# Patient Record
Sex: FEMALE | Race: WHITE | Employment: FULL TIME | URBAN - METROPOLITAN AREA
[De-identification: names, ages, dates, MRNs, and addresses within clinical notes are randomized per-mention and may not be internally consistent; named-entity substitution may affect disease eponyms.]

---
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
## 2021-01-05 ENCOUNTER — OFFICE VISIT (OUTPATIENT)
Dept: FAMILY MEDICINE CLINIC | Age: 21
End: 2021-01-05
Payer: COMMERCIAL

## 2021-01-05 VITALS
WEIGHT: 131 LBS | BODY MASS INDEX: 23.96 KG/M2 | SYSTOLIC BLOOD PRESSURE: 100 MMHG | TEMPERATURE: 98.3 F | OXYGEN SATURATION: 98 % | DIASTOLIC BLOOD PRESSURE: 68 MMHG | HEART RATE: 88 BPM

## 2021-01-05 DIAGNOSIS — K21.9 GASTROESOPHAGEAL REFLUX DISEASE WITHOUT ESOPHAGITIS: Primary | ICD-10-CM

## 2021-01-05 DIAGNOSIS — R19.7 DIARRHEA, UNSPECIFIED TYPE: ICD-10-CM

## 2021-01-05 PROCEDURE — G8420 CALC BMI NORM PARAMETERS: HCPCS | Performed by: NURSE PRACTITIONER

## 2021-01-05 PROCEDURE — 99213 OFFICE O/P EST LOW 20 MIN: CPT | Performed by: NURSE PRACTITIONER

## 2021-01-05 PROCEDURE — G8484 FLU IMMUNIZE NO ADMIN: HCPCS | Performed by: NURSE PRACTITIONER

## 2021-01-05 PROCEDURE — G8427 DOCREV CUR MEDS BY ELIG CLIN: HCPCS | Performed by: NURSE PRACTITIONER

## 2021-01-05 PROCEDURE — 1036F TOBACCO NON-USER: CPT | Performed by: NURSE PRACTITIONER

## 2021-01-05 RX ORDER — FAMOTIDINE 20 MG/1
20 TABLET, FILM COATED ORAL 2 TIMES DAILY
Qty: 60 TABLET | Refills: 0 | Status: SHIPPED | OUTPATIENT
Start: 2021-01-05 | End: 2021-01-29

## 2021-01-05 RX ORDER — OMEPRAZOLE 40 MG/1
40 CAPSULE, DELAYED RELEASE ORAL
Qty: 90 CAPSULE | Refills: 1 | Status: SHIPPED | OUTPATIENT
Start: 2021-01-05 | End: 2022-09-24

## 2021-01-05 SDOH — ECONOMIC STABILITY: TRANSPORTATION INSECURITY
IN THE PAST 12 MONTHS, HAS LACK OF TRANSPORTATION KEPT YOU FROM MEETINGS, WORK, OR FROM GETTING THINGS NEEDED FOR DAILY LIVING?: NO

## 2021-01-05 SDOH — ECONOMIC STABILITY: FOOD INSECURITY: WITHIN THE PAST 12 MONTHS, THE FOOD YOU BOUGHT JUST DIDN'T LAST AND YOU DIDN'T HAVE MONEY TO GET MORE.: NEVER TRUE

## 2021-01-05 SDOH — ECONOMIC STABILITY: TRANSPORTATION INSECURITY
IN THE PAST 12 MONTHS, HAS THE LACK OF TRANSPORTATION KEPT YOU FROM MEDICAL APPOINTMENTS OR FROM GETTING MEDICATIONS?: NO

## 2021-01-05 SDOH — ECONOMIC STABILITY: FOOD INSECURITY: WITHIN THE PAST 12 MONTHS, YOU WORRIED THAT YOUR FOOD WOULD RUN OUT BEFORE YOU GOT MONEY TO BUY MORE.: NEVER TRUE

## 2021-01-05 ASSESSMENT — PATIENT HEALTH QUESTIONNAIRE - PHQ9
1. LITTLE INTEREST OR PLEASURE IN DOING THINGS: 0
SUM OF ALL RESPONSES TO PHQ9 QUESTIONS 1 & 2: 0

## 2021-01-05 ASSESSMENT — ENCOUNTER SYMPTOMS
BELCHING: 1
NAUSEA: 1
CONSTIPATION: 0
DIARRHEA: 1
ABDOMINAL PAIN: 1
VOMITING: 0

## 2021-01-05 NOTE — PATIENT INSTRUCTIONS
SURVEY:    You may be receiving a survey from [employer] regarding your visit today. Please complete the survey to enable us to provide the highest quality of care to you and your family. If you cannot score us a very good (5 Stars) on any question, please call the office to discuss how we could have made your experience a very good one. Thank you.     Clinical Care Team: ALEXIS Naranjo-TRINITY Lopes LPN    Clerical Team: Jose Gann

## 2021-01-05 NOTE — PROGRESS NOTES
HPI Notes    Name: Zena Hair  : 2000         Chief Complaint:     Chief Complaint   Patient presents with    Abdominal Pain     Patient complains of mid abdomen pain bilateral. She said pain is getting worse. Complains of nausea, no vomiting, no fever, does have diarrhea. History of Present Illness:        Abdominal Pain  This is a new problem. The current episode started more than 1 month ago. The onset quality is sudden. Associated symptoms include belching, diarrhea (occasional) and nausea. Pertinent negatives include no constipation, fever, hematuria, melena or vomiting. Associated symptoms comments: Sensation of lump in throat  Bloating off and on. The pain is aggravated by eating. The pain is relieved by belching and bowel movements. She has tried proton pump inhibitors for the symptoms. The treatment provided mild relief. Past Medical History:     No past medical history on file. Reviewed all health maintenance requirements and ordered appropriate tests  Health Maintenance Due   Topic Date Due    Hepatitis C screen  2001    Varicella vaccine (1 of 2 - 2-dose childhood series) 2002    HPV vaccine (1 - 2-dose series) 2012    HIV screen  2016    Flu vaccine (1) 2020       Past Surgical History:     Past Surgical History:   Procedure Laterality Date    HERNIA REPAIR      MYRINGOTOMY AND TYMPANOSTOMY TUBE PLACEMENT          Medications:       Prior to Admission medications    Medication Sig Start Date End Date Taking?  Authorizing Provider   famotidine (PEPCID) 20 MG tablet Take 1 tablet by mouth 2 times daily 21  Yes ALEXIS Irwin CNP   omeprazole (PRILOSEC) 40 MG delayed release capsule Take 1 capsule by mouth every morning (before breakfast) 21  Yes ALEXIS Irwin CNP   norgestrel-ethinyl estradiol (LO/OVRAL) 0.3-30 MG-MCG per tablet Take 1 tablet by mouth daily 20  Yes Prakash Sethi DO   citalopram (CELEXA) 20 MG tablet Take 1 tablet by mouth daily 9/15/20  Yes Jean Harding APRN - CNP        Allergies:       Bee venom and Pcn [penicillins]    Social History:     Tobacco:    reports that she has never smoked. She has never used smokeless tobacco.  Alcohol:      has no history on file for alcohol. Drug Use:  has no history on file for drug. Family History:      No family history on file. Review of Systems:         Review of Systems   Constitutional: Negative for fever. Gastrointestinal: Positive for abdominal pain, diarrhea (occasional) and nausea. Negative for constipation, melena and vomiting. Genitourinary: Negative for hematuria. Physical Exam:     Vitals:  /68   Pulse 88   Temp 98.3 °F (36.8 °C) (Oral)   Wt 131 lb (59.4 kg)   SpO2 98%   BMI 23.96 kg/m²       Physical Exam  Vitals signs and nursing note reviewed. Constitutional:       Appearance: Normal appearance. She is well-developed. Cardiovascular:      Rate and Rhythm: Normal rate and regular rhythm. Heart sounds: Normal heart sounds, S1 normal and S2 normal.   Pulmonary:      Effort: Pulmonary effort is normal. No respiratory distress. Breath sounds: Normal breath sounds. Abdominal:      General: Bowel sounds are normal.      Palpations: Abdomen is soft. Tenderness: There is abdominal tenderness in the right lower quadrant and epigastric area. Negative signs include Weaver's sign, psoas sign and obturator sign. Skin:     General: Skin is warm and dry. Neurological:      Mental Status: She is alert and oriented to person, place, and time. Data:     No results found for: NA, K, CL, CO2, BUN, CREATININE, GLUCOSE, PROT, LABALBU, BILITOT, ALKPHOS, AST, ALT  No results found for: WBC, RBC, HGB, HCT, MCV, MCH, MCHC, RDW, PLT, MPV  No results found for: TSH  No results found for: CHOL, HDL, PSA, LABA1C       Assessment & Plan        Diagnosis Orders   1.  Gastroesophageal reflux disease without esophagitis  H. pylori antigen   2. Diarrhea, unspecified type  Giardia Antigen    Clostridium Difficile Toxin/Antigen    Gastrointestinal Panel, Molecular     Symptoms seem to be like uncontrolled GERD, however with diarrhea I do want to rule out H. pylori and other stool pathogen. Will send patient for stool studies. We will also start patient on Pepcid 20 mg p.o. twice daily. Patient educated about medication. Patient verbalizes understanding and agreement with plan. All questions answered. If symptoms do not resolve or worsen, return to office. Completed Refills   Requested Prescriptions     Signed Prescriptions Disp Refills    famotidine (PEPCID) 20 MG tablet 60 tablet 0     Sig: Take 1 tablet by mouth 2 times daily    omeprazole (PRILOSEC) 40 MG delayed release capsule 90 capsule 1     Sig: Take 1 capsule by mouth every morning (before breakfast)     No follow-ups on file. Orders Placed This Encounter   Medications    famotidine (PEPCID) 20 MG tablet     Sig: Take 1 tablet by mouth 2 times daily     Dispense:  60 tablet     Refill:  0    omeprazole (PRILOSEC) 40 MG delayed release capsule     Sig: Take 1 capsule by mouth every morning (before breakfast)     Dispense:  90 capsule     Refill:  1     Orders Placed This Encounter   Procedures    Giardia Antigen     Standing Status:   Future     Standing Expiration Date:   1/5/2022    Clostridium Difficile Toxin/Antigen     Standing Status:   Future     Standing Expiration Date:   1/5/2022    Gastrointestinal Panel, Molecular     Standing Status:   Future     Standing Expiration Date:   1/5/2022    H. pylori antigen     Standing Status:   Future     Standing Expiration Date:   1/5/2022         Patient Instructions   SURVEY:    You may be receiving a survey from [employer] regarding your visit today. Please complete the survey to enable us to provide the highest quality of care to you and your family.     If you

## 2021-02-08 ENCOUNTER — TELEPHONE (OUTPATIENT)
Dept: FAMILY MEDICINE CLINIC | Age: 21
End: 2021-02-08

## 2021-02-08 DIAGNOSIS — F41.1 GAD (GENERALIZED ANXIETY DISORDER): Primary | ICD-10-CM

## 2021-02-08 NOTE — LETTER
[address] [place] [address]  Phone: [phone]  Fax: [phone]    ALEXIS Henson CNP        February 8, 2021    Ranulfo Medina  [address] [facility] [address]      To Whom It May Concern:    My patient, Ranulfo Medina, has generalized anxiety disorder (F41.1). It is my opinion that she would benefit from an emotional support animal.     If you have any questions or concerns, please don't hesitate to call.     Sincerely,          ALEXIS Henson CNP

## 2021-02-08 NOTE — TELEPHONE ENCOUNTER
Patient called to see if Sherren Broaden would be willing to write a letter so she can have an emotional support animal at school. She states she has anxiety really bad. Patient last seen 1/5/21. Health Maintenance   Topic Date Due    Hepatitis C screen  2001    Varicella vaccine (1 of 2 - 2-dose childhood series) 02/16/2002    HPV vaccine (1 - 2-dose series) 02/16/2012    HIV screen  02/16/2016    Flu vaccine (1) 09/01/2020    Chlamydia screen  03/19/2021    DTaP/Tdap/Td vaccine (6 - Td) 10/22/2023    Hepatitis B vaccine  Completed    Hib vaccine  Completed    Hepatitis A vaccine  Aged Out    Meningococcal (ACWY) vaccine  Aged Out    Pneumococcal 0-64 years Vaccine  Aged Out             (applicable per patient's age: Cancer Screenings, Depression Screening, Fall Risk Screening, Immunizations)    No results found for: LABA1C, LABMICR, LDLCHOLESTEROL, LDLCALC, AST, ALT, BUN   (goal A1C is < 7)   (goal LDL is <100) need 30-50% reduction from baseline     BP Readings from Last 3 Encounters:   01/05/21 100/68   12/09/20 100/70   10/30/20 104/62    (goal /80)      All Future Testing planned in [facility]:  Lab Frequency Next Occurrence   H. pylori antigen Once 02/13/2021   Giardia Antigen Once 02/13/2021   Clostridium Difficile Toxin/Antigen Once 02/13/2021   Gastrointestinal Panel, Molecular Once 02/13/2021       Next Visit Date:  No future appointments.          Patient Active Problem List:     Dysmenorrhea

## 2021-02-08 NOTE — TELEPHONE ENCOUNTER
I can write a letter stating that she has anxiety, but I can't certain any animal as an WILLARD. What kind of animal is she considering? Usually those types of things come from psychiatry or counseling.

## 2021-02-08 NOTE — TELEPHONE ENCOUNTER
Patient is staying in a single dorm room, she will have her cat.   No form to fill out , just needs a letter stating her dx and she would benefit having an emotional support animal  Please advise

## 2021-02-11 ENCOUNTER — TELEPHONE (OUTPATIENT)
Dept: FAMILY MEDICINE CLINIC | Age: 21
End: 2021-02-11

## 2021-02-11 NOTE — TELEPHONE ENCOUNTER
Patient received an email regarding note you wrote for her to have her cat in her dorm room with her, needs additional information  Question # 1 is how the animal will help patient? Question #2 is Does anxiety impact patient's daily life or bodily function?

## 2021-02-11 NOTE — LETTER
[address] [place] [address]  Phone: [phone]  Fax: Kelsie Painting [phone], APRN - CNP        February 11, 2021    John Hankins  [address] [facility] [address]  [place] [address]      To Whom It May Concern:    My patient, John Hankins, has generalized anxiety disorder (F41.1). She has been diagnosed with this since 2019. Part of the diagnostic criteria in the DSM V is that anxiety is intrusive into the patient's life. Patient has at time had difficulty leaving her home and carrying out daily activities. It is my opinion that she would benefit from an emotional support animal.  Research has shown that petting and holding an emotional support animal for 15 minutes can reduce anxiety by 10%. I believe that this would benefit my patient. If you have any questions or concerns, please don't hesitate to call.     Sincerely,          Zoya Pleitez, ALEXIS - CNP

## 2021-04-19 ENCOUNTER — OFFICE VISIT (OUTPATIENT)
Dept: FAMILY MEDICINE CLINIC | Age: 21
End: 2021-04-19
Payer: COMMERCIAL

## 2021-04-19 VITALS
DIASTOLIC BLOOD PRESSURE: 70 MMHG | HEART RATE: 70 BPM | OXYGEN SATURATION: 98 % | BODY MASS INDEX: 23.96 KG/M2 | SYSTOLIC BLOOD PRESSURE: 102 MMHG | WEIGHT: 131 LBS | TEMPERATURE: 97.7 F

## 2021-04-19 DIAGNOSIS — F33.1 MODERATE EPISODE OF RECURRENT MAJOR DEPRESSIVE DISORDER (HCC): Primary | ICD-10-CM

## 2021-04-19 PROCEDURE — 99213 OFFICE O/P EST LOW 20 MIN: CPT | Performed by: NURSE PRACTITIONER

## 2021-04-19 PROCEDURE — 1036F TOBACCO NON-USER: CPT | Performed by: NURSE PRACTITIONER

## 2021-04-19 PROCEDURE — G8427 DOCREV CUR MEDS BY ELIG CLIN: HCPCS | Performed by: NURSE PRACTITIONER

## 2021-04-19 PROCEDURE — G8420 CALC BMI NORM PARAMETERS: HCPCS | Performed by: NURSE PRACTITIONER

## 2021-04-19 RX ORDER — CITALOPRAM 40 MG/1
40 TABLET ORAL DAILY
Qty: 90 TABLET | Refills: 0 | Status: SHIPPED | OUTPATIENT
Start: 2021-04-19 | End: 2021-11-18

## 2021-04-19 ASSESSMENT — ENCOUNTER SYMPTOMS
DIARRHEA: 0
COUGH: 0
NAUSEA: 0
SHORTNESS OF BREATH: 0
VOMITING: 0

## 2021-04-19 NOTE — PATIENT INSTRUCTIONS
SURVEY:    You may be receiving a survey from [employer] regarding your visit today. Please complete the survey to enable us to provide the highest quality of care to you and your family. If you cannot score us a very good (5 Stars) on any question, please call the office to discuss how we could have made your experience a very good one. Thank you.     Clinical Care Team: ALEXIS Sunshine-TRINITY Cota November, LPN    Clerical Team: Jose Obrien

## 2021-04-19 NOTE — PROGRESS NOTES
HPI Notes    Name: Ibeth An  : 2000         Chief Complaint:     Chief Complaint   Patient presents with   [address] Problem     Patient here today to discuss anxiety and depression, she does not feel her citalopram is working as well. History of Present Illness:        HPI  Pt is a 21 yo female who reports for depression. Pt was dx with depression in Aug 2020 after starting college. Pt was started on Celexa 10mg and then increased to 20mg. Pt states that the medication helped and she felt good. About 3-4 weeks ago pt noticed that she was experiencing increased sadness. States that she has no interest in things she used to enjoy, lays in bed and doesn't want to get up, and doesn't hang out with friends. Has some good days where she feels better, but then has bad days. Exercises every now and then. Denies thoughts of harming herself. Past Medical History:     No past medical history on file. Reviewed all health maintenance requirements and ordered appropriate tests  Health Maintenance Due   Topic Date Due    Hepatitis C screen  Never done    Varicella vaccine (1 of 2 - 2-dose childhood series) Never done    HPV vaccine (1 - 2-dose series) Never done    HIV screen  Never done    COVID-19 Vaccine (1) Never done    Chlamydia screen  2021       Past Surgical History:     Past Surgical History:   Procedure Laterality Date    HERNIA REPAIR      MYRINGOTOMY AND TYMPANOSTOMY TUBE PLACEMENT          Medications:       Prior to Admission medications    Medication Sig Start Date End Date Taking?  Authorizing Provider   citalopram (CELEXA) 40 MG tablet Take 1 tablet by mouth daily 21  Yes ALEXIS Barlow CNP   famotidine (PEPCID) 20 MG tablet take 1 tablet by mouth twice a day 21  Yes ALEXIS Barlow CNP   omeprazole (PRILOSEC) 40 MG delayed release capsule Take 1 capsule by mouth every morning (before breakfast) 21  Yes ALEXIS Barlow CNP   norgestrel-ethinyl estradiol (LO/OVRAL) 0.3-30 MG-MCG per tablet Take 1 tablet by mouth daily 12/9/20  Yes Snow Brewster,         Allergies:       Bee venom and Pcn [penicillins]    Social History:     Tobacco:    reports that she has never smoked. She has never used smokeless tobacco.  Alcohol:      has no history on file for alcohol. Drug Use:  has no history on file for drug. Family History:      No family history on file. Review of Systems:         Review of Systems   Constitutional: Negative for chills and fever. Respiratory: Negative for cough and shortness of breath. Cardiovascular: Negative for chest pain and palpitations. Gastrointestinal: Negative for diarrhea, nausea and vomiting. Neurological: Negative for dizziness, seizures and headaches. Psychiatric/Behavioral: Positive for dysphoric mood and sleep disturbance (hypersomnolence). Negative for suicidal ideas. The patient is not nervous/anxious. Physical Exam:     Vitals:  /70   Pulse 70   Temp 97.7 °F (36.5 °C) (Oral)   Wt 131 lb (59.4 kg)   SpO2 98%   BMI 23.96 kg/m²       Physical Exam  Vitals signs and nursing note reviewed. Constitutional:       Appearance: Normal appearance. She is well-developed. Cardiovascular:      Rate and Rhythm: Normal rate and regular rhythm. Heart sounds: Normal heart sounds, S1 normal and S2 normal.   Pulmonary:      Effort: Pulmonary effort is normal. No respiratory distress. Breath sounds: Normal breath sounds. Abdominal:      General: Bowel sounds are normal.      Palpations: Abdomen is soft. Tenderness: There is no abdominal tenderness. Skin:     General: Skin is warm and dry. Neurological:      Mental Status: She is alert and oriented to person, place, and time. Psychiatric:         Attention and Perception: Attention normal.         Mood and Affect: Mood is depressed.          Speech: Speech normal.         Behavior: Behavior normal. Behavior is cooperative. Thought Content: Thought content does not include homicidal or suicidal ideation. Thought content does not include homicidal or suicidal plan. Cognition and Memory: Cognition normal.         Judgment: Judgment normal.               Data:     No results found for: NA, K, CL, CO2, BUN, CREATININE, GLUCOSE, PROT, LABALBU, BILITOT, ALKPHOS, AST, ALT  No results found for: WBC, RBC, HGB, HCT, MCV, MCH, MCHC, RDW, PLT, MPV  No results found for: TSH  No results found for: CHOL, HDL, PSA, LABA1C       Assessment & Plan        Diagnosis Orders   1. Moderate episode of recurrent major depressive disorder (HCC)       Pt tolerates medication well. Will increase celexa to 40mg. If additional therapy needed, would start on abilify. Would also need to do counseling. Patient verbalizes understanding and agreement with plan. All questions answered. If symptoms do not resolve or worsen, return to office. Completed Refills   Requested Prescriptions     Signed Prescriptions Disp Refills    citalopram (CELEXA) 40 MG tablet 90 tablet 0     Sig: Take 1 tablet by mouth daily     No follow-ups on file. Orders Placed This Encounter   Medications    citalopram (CELEXA) 40 MG tablet     Sig: Take 1 tablet by mouth daily     Dispense:  90 tablet     Refill:  0     No orders of the defined types were placed in this encounter. Patient Instructions   SURVEY:    You may be receiving a survey from [employer] regarding your visit today. Please complete the survey to enable us to provide the highest quality of care to you and your family. If you cannot score us a very good (5 Stars) on any question, please call the office to discuss how we could have made your experience a very good one. Thank you.     Clinical Care Team: ALEXIS Kauffman-TRINITY España LPN    Clerical Team: Jose Mcmahon Stefany Buckley        Electronically signed by ALEXIS Gupta CNP on 4/19/2021 at 8:37 AM           Completed Refills      Requested Prescriptions     Signed Prescriptions Disp Refills    citalopram (CELEXA) 40 MG tablet 90 tablet 0     Sig: Take 1 tablet by mouth daily         Kathy received counseling on the following healthy behaviors: medication adherence  Reviewed prior labs and health maintenance. Continue current medications, diet and exercise. Discussed use, benefit, and side effects of prescribed medications. Barriers to medication compliance addressed. Patient given educational materials - see patient instructions. All patient questions answered. Patient voiced understanding.

## 2021-08-12 ENCOUNTER — OFFICE VISIT (OUTPATIENT)
Dept: FAMILY MEDICINE CLINIC | Age: 21
End: 2021-08-12
Payer: COMMERCIAL

## 2021-08-12 VITALS
SYSTOLIC BLOOD PRESSURE: 96 MMHG | TEMPERATURE: 98.7 F | HEART RATE: 78 BPM | HEIGHT: 62 IN | WEIGHT: 137 LBS | DIASTOLIC BLOOD PRESSURE: 60 MMHG | OXYGEN SATURATION: 98 % | BODY MASS INDEX: 25.21 KG/M2

## 2021-08-12 DIAGNOSIS — Z02.5 ROUTINE SPORTS EXAMINATION: Primary | ICD-10-CM

## 2021-08-12 PROCEDURE — 99395 PREV VISIT EST AGE 18-39: CPT | Performed by: NURSE PRACTITIONER

## 2021-08-12 ASSESSMENT — ENCOUNTER SYMPTOMS
CONSTIPATION: 0
COUGH: 0
ABDOMINAL PAIN: 0
NAUSEA: 0
SORE THROAT: 0
BACK PAIN: 0
DIARRHEA: 0
SHORTNESS OF BREATH: 0
BLOOD IN STOOL: 0
VOMITING: 0

## 2021-08-12 NOTE — PROGRESS NOTES
HPI Notes    Name: Leticia Beckman  : 2000         Chief Complaint:     Chief Complaint   Patient presents with    Annual Exam     Patient here today for sports physica for college. Will be on swim team.       History of Present Illness:        HPI  Pt is a 21 yo female who presents for a college sports physical. Pt will be on the swim team at the [address] [place] [address]. Past Medical History:     No past medical history on file. Reviewed all health maintenance requirements and ordered appropriate tests  Health Maintenance Due   Topic Date Due    Hepatitis C screen  Never done    Varicella vaccine (1 of 2 - 2-dose childhood series) Never done    HPV vaccine (1 - 2-dose series) Never done    COVID-19 Vaccine (1) Never done    HIV screen  Never done    Chlamydia screen  2021       Past Surgical History:     Past Surgical History:   Procedure Laterality Date    HERNIA REPAIR      MYRINGOTOMY AND TYMPANOSTOMY TUBE PLACEMENT          Medications:       Prior to Admission medications    Medication Sig Start Date End Date Taking? Authorizing Provider   ELINEST 0.3-30 MG-MCG per tablet take 1 tablet by mouth once daily 21  Yes Narciso Kayser,    citalopram (CELEXA) 40 MG tablet Take 1 tablet by mouth daily 21  Yes ALEXIS Sidhu CNP   famotidine (PEPCID) 20 MG tablet take 1 tablet by mouth twice a day 21  Yes ALEXIS Sidhu CNP   omeprazole (PRILOSEC) 40 MG delayed release capsule Take 1 capsule by mouth every morning (before breakfast) 21  Yes ALEXIS Sidhu CNP        Allergies:       Bee venom and Pcn [penicillins]    Social History:     Tobacco:    reports that she has never smoked. She has never used smokeless tobacco.  Alcohol:      has no history on file for alcohol use. Drug Use:  has no history on file for drug use. Family History:      No family history on file.     Review of Systems:         Review of Systems   Constitutional: Negative for chills and fever. HENT: Negative for sore throat. Respiratory: Negative for cough and shortness of breath. Cardiovascular: Negative for chest pain, palpitations and leg swelling. Gastrointestinal: Negative for abdominal pain, blood in stool, constipation, diarrhea, nausea and vomiting. Genitourinary: Negative for dysuria, frequency and hematuria. Musculoskeletal: Negative for back pain and neck pain. Skin: Negative for rash. Neurological: Negative for dizziness, tremors, seizures, weakness and headaches. Hematological: Does not bruise/bleed easily. Psychiatric/Behavioral: Negative for suicidal ideas. The patient is not nervous/anxious. Physical Exam:     Vitals:  BP 96/60   Pulse 78   Temp 98.7 °F (37.1 °C) (Oral)   Ht 5' 2\" (1.575 m)   Wt 137 lb (62.1 kg)   SpO2 98%   BMI 25.06 kg/m²       Physical Exam  Vitals and nursing note reviewed. Constitutional:       Appearance: Normal appearance. She is well-developed. HENT:      Head: Normocephalic. Right Ear: Hearing, tympanic membrane and external ear normal.      Left Ear: Hearing, tympanic membrane and external ear normal.      Nose: Nose normal.      Mouth/Throat:      Pharynx: Uvula midline. Eyes:      Conjunctiva/sclera: Conjunctivae normal.      Pupils: Pupils are equal, round, and reactive to light. Neck:      Thyroid: No thyroid mass. Vascular: No carotid bruit. Trachea: Trachea normal.   Cardiovascular:      Rate and Rhythm: Normal rate and regular rhythm. Pulses:           Dorsalis pedis pulses are 2+ on the right side and 2+ on the left side. Heart sounds: Normal heart sounds, S1 normal and S2 normal.   Pulmonary:      Effort: Pulmonary effort is normal.      Breath sounds: Normal breath sounds. Abdominal:      Palpations: Abdomen is soft. Tenderness: There is no abdominal tenderness. Musculoskeletal:         General: Normal range of motion.       Cervical back: Normal range of motion. Comments: Gait steady. No scapular asymmetry or spinal curvature during Wild forward bend test. Deep knee bend and duck walk WNL     Skin:     General: Skin is warm and dry. Findings: No rash. Neurological:      Mental Status: She is alert and oriented to person, place, and time. GCS: GCS eye subscore is 4. GCS verbal subscore is 5. GCS motor subscore is 6. Deep Tendon Reflexes: Reflexes are normal and symmetric. Psychiatric:         Speech: Speech normal.         Behavior: Behavior normal.         Thought Content: Thought content normal.         Judgment: Judgment normal.               Data:     No results found for: NA, K, CL, CO2, BUN, CREATININE, GLUCOSE, PROT, LABALBU, BILITOT, ALKPHOS, AST, ALT  No results found for: WBC, RBC, HGB, HCT, MCV, MCH, MCHC, RDW, PLT, MPV  No results found for: TSH  No results found for: CHOL, HDL, PSA, LABA1C       Assessment & Plan        Diagnosis Orders   1. Routine sports examination       Routine adult health physical.  Patient has no complaints at this time. No abnormalities or deficiencies. Patient educated about adequate water intake  Patient educated about exercise and fitness  Patient educated about nutrition    Patient evaluated and found to be fit for sports. Patient educated about concussion safety and return to play protocol. Patient denies any further needs or questions. Health maintenance requirements reviewed with patient    If any further needs, return to office. Completed Refills   Requested Prescriptions      No prescriptions requested or ordered in this encounter     No follow-ups on file. No orders of the defined types were placed in this encounter. No orders of the defined types were placed in this encounter. Patient Instructions   SURVEY:    You may be receiving a survey from [employer] regarding your visit today.     Please complete the survey to enable us to provide the highest quality of care to you and your family. If you cannot score us a very good (5 Stars) on any question, please call the office to discuss how we could have made your experience a very good one. Thank you. Clinical Care Team: JOHN Arora LPN    Clerical Team: Peng Nelson        Electronically signed by ALEXIS Arora CNP on 8/12/2021 at 9:24 AM           Completed Refills      Requested Prescriptions      No prescriptions requested or ordered in this encounter         Kathy received counseling on the following healthy behaviors: nutrition, exercise and medication adherence  Reviewed prior labs and health maintenance. Continue current medications, diet and exercise. Discussed use, benefit, and side effects of prescribed medications. Barriers to medication compliance addressed. Patient given educational materials - see patient instructions. All patient questions answered. Patient voiced understanding.

## 2021-08-31 ENCOUNTER — OFFICE VISIT (OUTPATIENT)
Dept: FAMILY MEDICINE CLINIC | Age: 21
End: 2021-08-31
Payer: COMMERCIAL

## 2021-08-31 VITALS
WEIGHT: 138 LBS | HEART RATE: 82 BPM | SYSTOLIC BLOOD PRESSURE: 102 MMHG | TEMPERATURE: 97.9 F | DIASTOLIC BLOOD PRESSURE: 60 MMHG | OXYGEN SATURATION: 98 % | BODY MASS INDEX: 25.24 KG/M2

## 2021-08-31 DIAGNOSIS — M21.612 BUNION, LEFT FOOT: Primary | ICD-10-CM

## 2021-08-31 PROCEDURE — 1036F TOBACCO NON-USER: CPT | Performed by: NURSE PRACTITIONER

## 2021-08-31 PROCEDURE — G8419 CALC BMI OUT NRM PARAM NOF/U: HCPCS | Performed by: NURSE PRACTITIONER

## 2021-08-31 PROCEDURE — G8427 DOCREV CUR MEDS BY ELIG CLIN: HCPCS | Performed by: NURSE PRACTITIONER

## 2021-08-31 PROCEDURE — 99213 OFFICE O/P EST LOW 20 MIN: CPT | Performed by: NURSE PRACTITIONER

## 2021-08-31 ASSESSMENT — ENCOUNTER SYMPTOMS
VOMITING: 0
SHORTNESS OF BREATH: 0
NAUSEA: 0
DIARRHEA: 0
COUGH: 0

## 2021-08-31 NOTE — PATIENT INSTRUCTIONS
SURVEY:    You may be receiving a survey from [employer] regarding your visit today. Please complete the survey to enable us to provide the highest quality of care to you and your family. If you cannot score us a very good (5 Stars) on any question, please call the office to discuss how we could have made your experience a very good one. Thank you.     Clinical Care Team: ALEXIS Eldridge-TRINITY Solares LPN    Clerical Team: Jose Perez

## 2021-08-31 NOTE — PROGRESS NOTES
on file for alcohol use. Drug Use:  has no history on file for drug use. Family History:      No family history on file. Review of Systems:         Review of Systems   Constitutional: Negative for chills and fever. Respiratory: Negative for cough and shortness of breath. Cardiovascular: Negative for chest pain and palpitations. Gastrointestinal: Negative for diarrhea, nausea and vomiting. Musculoskeletal:        Left foot pain   Neurological: Negative for dizziness, seizures and headaches. Physical Exam:     Vitals:  /60   Pulse 82   Temp 97.9 °F (36.6 °C) (Oral)   Wt 138 lb (62.6 kg)   SpO2 98%   BMI 25.24 kg/m²       Physical Exam  Vitals and nursing note reviewed. Constitutional:       Appearance: Normal appearance. She is well-developed. Cardiovascular:      Rate and Rhythm: Normal rate and regular rhythm. Heart sounds: Normal heart sounds, S1 normal and S2 normal.   Pulmonary:      Effort: Pulmonary effort is normal. No respiratory distress. Breath sounds: Normal breath sounds. Abdominal:      General: Bowel sounds are normal.      Palpations: Abdomen is soft. Tenderness: There is no abdominal tenderness. Musculoskeletal:        Feet:    Skin:     General: Skin is warm and dry. Neurological:      Mental Status: She is alert and oriented to person, place, and time. Data:     No results found for: NA, K, CL, CO2, BUN, CREATININE, GLUCOSE, PROT, LABALBU, BILITOT, ALKPHOS, AST, ALT  No results found for: WBC, RBC, HGB, HCT, MCV, MCH, MCHC, RDW, PLT, MPV  No results found for: TSH  No results found for: CHOL, HDL, PSA, LABA1C       Assessment & Plan        Diagnosis Orders   1. Bunion, left foot  Cheryl Chen, NEELIMA, Podiatry, [facility]     May use bunion cushions. Will send to Dr Ludmila Mooney for evaluation. Patient verbalizes understanding and agreement with plan. All questions answered.  If symptoms do not resolve or worsen, return to office. Completed Refills   Requested Prescriptions      No prescriptions requested or ordered in this encounter     No follow-ups on file. No orders of the defined types were placed in this encounter. Orders Placed This Encounter   Procedures   Cory Ford DPM, Podiatry, [facility]     Referral Priority:   Routine     Referral Type:   Eval and Treat     Referral Reason:   Specialty Services Required     Referred to Provider:   Travis Osler, DPM     Requested Specialty:   Podiatry     Number of Visits Requested:   1         Patient Instructions   SURVEY:    You may be receiving a survey from [employer] regarding your visit today. Please complete the survey to enable us to provide the highest quality of care to you and your family. If you cannot score us a very good (5 Stars) on any question, please call the office to discuss how we could have made your experience a very good one. Thank you. Clinical Care Team: JOHN Garcia LPN    Clerical Team: Peng Nelson        Electronically signed by ALEXIS Garcia CNP on 8/31/2021 at 12:19 PM           Completed Refills      Requested Prescriptions      No prescriptions requested or ordered in this encounter         Kathy received counseling on the following healthy behaviors: medication adherence  Reviewed prior labs and health maintenance. Continue current medications, diet and exercise. Discussed use, benefit, and side effects of prescribed medications. Barriers to medication compliance addressed. Patient given educational materials - see patient instructions. All patient questions answered. Patient voiced understanding.

## 2021-10-04 ENCOUNTER — TELEPHONE (OUTPATIENT)
Dept: FAMILY MEDICINE CLINIC | Age: 21
End: 2021-10-04

## 2021-10-04 NOTE — TELEPHONE ENCOUNTER
Patient states she has tested negative for covid and strep at her [facility]. She is calling c/o sore throat and cough X 2 weeks. Can we bring her in for an appt or should she have another covid test?  Patient states it was a rapid test at her [facility]. Please advise. Health Maintenance   Topic Date Due    Hepatitis C screen  Never done    Varicella vaccine (1 of 2 - 2-dose childhood series) Never done    HPV vaccine (1 - 2-dose series) Never done    COVID-19 Vaccine (1) Never done    HIV screen  Never done    Chlamydia screen  03/19/2021    Flu vaccine (1) Never done    DTaP/Tdap/Td vaccine (7 - Td or Tdap) 10/22/2023    Hepatitis B vaccine  Completed    Hib vaccine  Completed    Hepatitis A vaccine  Aged Out    Meningococcal (ACWY) vaccine  Aged Out    Pneumococcal 0-64 years Vaccine  Aged Out             (applicable per patient's age: Cancer Screenings, Depression Screening, Fall Risk Screening, Immunizations)    No results found for: LABA1C, LABMICR, LDLCHOLESTEROL, LDLCALC, AST, ALT, BUN   (goal A1C is < 7)   (goal LDL is <100) need 30-50% reduction from baseline     BP Readings from Last 3 Encounters:   08/31/21 102/60   08/12/21 96/60   04/19/21 102/70    (goal /80)      All Future Testing planned in [facility]:  Lab Frequency Next Occurrence   H. pylori antigen Once 01/04/2022   Giardia Antigen Once 01/04/2022   Clostridium Difficile Toxin/Antigen Once 01/04/2022   Gastrointestinal Panel, Molecular Once 01/04/2022       Next Visit Date:  No future appointments.          Patient Active Problem List:     Dysmenorrhea     Moderate episode of recurrent major depressive disorder ([facility] [address]

## 2021-10-05 ENCOUNTER — HOSPITAL ENCOUNTER (OUTPATIENT)
Age: 21
Discharge: HOME | End: 2021-10-05
Payer: COMMERCIAL

## 2021-10-05 ENCOUNTER — OFFICE VISIT (OUTPATIENT)
Dept: FAMILY MEDICINE CLINIC | Age: 21
End: 2021-10-05
Payer: COMMERCIAL

## 2021-10-05 ENCOUNTER — TELEPHONE (OUTPATIENT)
Dept: FAMILY MEDICINE CLINIC | Age: 21
End: 2021-10-05

## 2021-10-05 VITALS
SYSTOLIC BLOOD PRESSURE: 102 MMHG | WEIGHT: 140 LBS | DIASTOLIC BLOOD PRESSURE: 80 MMHG | OXYGEN SATURATION: 98 % | BODY MASS INDEX: 25.61 KG/M2 | TEMPERATURE: 98.2 F | HEART RATE: 76 BPM

## 2021-10-05 DIAGNOSIS — J02.9 ACUTE PHARYNGITIS, UNSPECIFIED ETIOLOGY: ICD-10-CM

## 2021-10-05 DIAGNOSIS — J02.9 ACUTE PHARYNGITIS, UNSPECIFIED ETIOLOGY: Primary | ICD-10-CM

## 2021-10-05 LAB
ABSOLUTE EOS #: 0.2 K/UL (ref 0–0.4)
ABSOLUTE IMMATURE GRANULOCYTE: ABNORMAL K/UL (ref 0–0.3)
ABSOLUTE LYMPH #: 2.5 K/UL (ref 1.2–5.2)
ABSOLUTE MONO #: 0.7 K/UL (ref 0–1)
ALBUMIN SERPL-MCNC: 4.2 G/DL (ref 3.5–5.2)
ALBUMIN/GLOBULIN RATIO: NORMAL (ref 1–2.5)
ALP BLD-CCNC: 64 U/L (ref 35–104)
ALT SERPL-CCNC: 18 U/L (ref 5–33)
ANION GAP SERPL CALCULATED.3IONS-SCNC: 10 MMOL/L (ref 9–17)
AST SERPL-CCNC: 18 U/L
BASOPHILS # BLD: 0 % (ref 0–2)
BASOPHILS ABSOLUTE: 0 K/UL (ref 0–0.2)
BILIRUB SERPL-MCNC: 0.52 MG/DL (ref 0.3–1.2)
BUN BLDV-MCNC: 9 MG/DL (ref 6–20)
BUN/CREAT BLD: 11 (ref 9–20)
CALCIUM SERPL-MCNC: 9.3 MG/DL (ref 8.6–10.4)
CHLORIDE BLD-SCNC: 104 MMOL/L (ref 98–107)
CO2: 23 MMOL/L (ref 20–31)
CREAT SERPL-MCNC: 0.81 MG/DL (ref 0.5–0.9)
DIFFERENTIAL TYPE: YES
EOSINOPHILS RELATIVE PERCENT: 2 % (ref 0–5)
GFR AFRICAN AMERICAN: >60 ML/MIN
GFR NON-AFRICAN AMERICAN: >60 ML/MIN
GFR SERPL CREATININE-BSD FRML MDRD: NORMAL ML/MIN/{1.73_M2}
GFR SERPL CREATININE-BSD FRML MDRD: NORMAL ML/MIN/{1.73_M2}
GLUCOSE BLD-MCNC: 89 MG/DL (ref 70–99)
HCT VFR BLD CALC: 40.2 % (ref 36–46)
HEMOGLOBIN: 13.9 G/DL (ref 12–16)
IMMATURE GRANULOCYTES: ABNORMAL %
LYMPHOCYTES # BLD: 23 % (ref 15–40)
MCH RBC QN AUTO: 30.5 PG (ref 26–34)
MCHC RBC AUTO-ENTMCNC: 34.5 G/DL (ref 31–37)
MCV RBC AUTO: 88.3 FL (ref 80–100)
MONOCYTES # BLD: 6 % (ref 4–8)
MONONUCLEOSIS SCREEN: NEGATIVE
NRBC AUTOMATED: ABNORMAL PER 100 WBC
PDW BLD-RTO: 12.4 % (ref 12.1–15.2)
PLATELET # BLD: 305 K/UL (ref 140–450)
PLATELET ESTIMATE: ABNORMAL
PMV BLD AUTO: ABNORMAL FL (ref 6–12)
POTASSIUM SERPL-SCNC: 4.6 MMOL/L (ref 3.7–5.3)
RBC # BLD: 4.55 M/UL (ref 4–5.2)
RBC # BLD: ABNORMAL 10*6/UL
SEG NEUTROPHILS: 69 % (ref 47–75)
SEGMENTED NEUTROPHILS ABSOLUTE COUNT: 7.2 K/UL (ref 2.5–7)
SODIUM BLD-SCNC: 137 MMOL/L (ref 135–144)
TOTAL PROTEIN: 6.8 G/DL (ref 6.4–8.3)
WBC # BLD: 10.6 K/UL (ref 4.5–13.5)
WBC # BLD: ABNORMAL 10*3/UL

## 2021-10-05 PROCEDURE — 80053 COMPREHEN METABOLIC PANEL: CPT

## 2021-10-05 PROCEDURE — G8484 FLU IMMUNIZE NO ADMIN: HCPCS | Performed by: NURSE PRACTITIONER

## 2021-10-05 PROCEDURE — 36415 COLL VENOUS BLD VENIPUNCTURE: CPT

## 2021-10-05 PROCEDURE — 86308 HETEROPHILE ANTIBODY SCREEN: CPT

## 2021-10-05 PROCEDURE — 1036F TOBACCO NON-USER: CPT | Performed by: NURSE PRACTITIONER

## 2021-10-05 PROCEDURE — 85025 COMPLETE CBC W/AUTO DIFF WBC: CPT

## 2021-10-05 PROCEDURE — 99213 OFFICE O/P EST LOW 20 MIN: CPT | Performed by: NURSE PRACTITIONER

## 2021-10-05 PROCEDURE — G8419 CALC BMI OUT NRM PARAM NOF/U: HCPCS | Performed by: NURSE PRACTITIONER

## 2021-10-05 PROCEDURE — G8427 DOCREV CUR MEDS BY ELIG CLIN: HCPCS | Performed by: NURSE PRACTITIONER

## 2021-10-05 RX ORDER — IBUPROFEN 600 MG/1
TABLET ORAL
COMMUNITY
Start: 2021-09-09 | End: 2022-08-30 | Stop reason: ALTCHOICE

## 2021-10-05 RX ORDER — DOXYCYCLINE HYCLATE 100 MG
100 TABLET ORAL 2 TIMES DAILY
Qty: 14 TABLET | Refills: 0 | Status: SHIPPED | OUTPATIENT
Start: 2021-10-05 | End: 2021-10-12

## 2021-10-05 ASSESSMENT — ENCOUNTER SYMPTOMS
RHINORRHEA: 1
COUGH: 1
SORE THROAT: 1
VOMITING: 0
NAUSEA: 0
DIARRHEA: 0

## 2021-10-05 NOTE — TELEPHONE ENCOUNTER
----- Message from ALEXIS Gonzalez - CNP sent at 10/5/2021 11:19 AM EDT -----  Mono negative.  Please pend doxycycline 100mg PO BID x 7 days

## 2021-10-05 NOTE — PROGRESS NOTES
HPI Notes    Name: Jesi Hendrickson  : 2000         Chief Complaint:     Chief Complaint   Patient presents with    Cough     Patient complains of productive cough x 1 week. No fever now. She said she felt feverish for a couple days.  Pharyngitis     Patient here today with sore throat x 2 weeks. Was checked for covid and strep ,both were negative. Throat is improving       History of Present Illness:        URI   This is a new problem. The current episode started 1 to 4 weeks ago (2 weeks). The problem has been waxing and waning. There has been no fever. Associated symptoms include congestion (minor head), coughing, rhinorrhea and a sore throat. Pertinent negatives include no diarrhea, nausea or vomiting. Associated symptoms comments: Post nasal drip. Past Medical History:     No past medical history on file. Reviewed all health maintenance requirements and ordered appropriate tests  Health Maintenance Due   Topic Date Due    Hepatitis C screen  Never done    Varicella vaccine (1 of 2 - 2-dose childhood series) Never done    HPV vaccine (1 - 2-dose series) Never done    COVID-19 Vaccine (1) Never done    HIV screen  Never done    Chlamydia screen  2021    Flu vaccine (1) Never done       Past Surgical History:     Past Surgical History:   Procedure Laterality Date    HERNIA REPAIR      MYRINGOTOMY AND TYMPANOSTOMY TUBE PLACEMENT          Medications:       Prior to Admission medications    Medication Sig Start Date End Date Taking?  Authorizing Provider   ibuprofen (ADVIL;MOTRIN) 600 MG tablet take 1 tablet by mouth twice a day with food or milk if needed 21  Yes Historical Provider, MD Beal Kitchen 0.3-30 MG-MCG per tablet take 1 tablet by mouth once daily 21  Yes Willard Houston DO   citalopram (CELEXA) 40 MG tablet Take 1 tablet by mouth daily 21  Yes ALEXIS Wells CNP   famotidine (PEPCID) 20 MG tablet take 1 tablet by mouth twice a day 21  Yes Estefanía GranadosALEXIS - CNP   omeprazole (PRILOSEC) 40 MG delayed release capsule Take 1 capsule by mouth every morning (before breakfast) 1/5/21  Yes Estefanía GranadosALEXIS - CNP        Allergies:       Bee venom and Pcn [penicillins]    Social History:     Tobacco:    reports that she has never smoked. She has never used smokeless tobacco.  Alcohol:      has no history on file for alcohol use. Drug Use:  has no history on file for drug use. Family History:      No family history on file. Review of Systems:         Review of Systems   HENT: Positive for congestion (minor head), rhinorrhea and sore throat. Respiratory: Positive for cough. Gastrointestinal: Negative for diarrhea, nausea and vomiting. Physical Exam:     Vitals:  /80   Pulse 76   Temp 98.2 °F (36.8 °C) (Oral)   Wt 140 lb (63.5 kg)   SpO2 98%   BMI 25.61 kg/m²       Physical Exam  Vitals and nursing note reviewed. Constitutional:       Appearance: She is well-developed. HENT:      Nose: Rhinorrhea present. Mouth/Throat:      Pharynx: Posterior oropharyngeal erythema present. No oropharyngeal exudate. Cardiovascular:      Rate and Rhythm: Normal rate and regular rhythm. Pulmonary:      Effort: Pulmonary effort is normal. No respiratory distress. Breath sounds: Normal breath sounds. Skin:     General: Skin is warm and dry. Neurological:      Mental Status: She is alert and oriented to person, place, and time. Data:     No results found for: NA, K, CL, CO2, BUN, CREATININE, GLUCOSE, PROT, LABALBU, BILITOT, ALKPHOS, AST, ALT  No results found for: WBC, RBC, HGB, HCT, MCV, MCH, MCHC, RDW, PLT, MPV  No results found for: TSH  No results found for: CHOL, HDL, PSA, LABA1C       Assessment & Plan        Diagnosis Orders   1.  Acute pharyngitis, unspecified etiology  Mononucleosis Screen    CBC Auto Differential    Comprehensive Metabolic Panel     Pt has been exposed to mononucleosis so will instructions. All patient questions answered. Patient voiced understanding.

## 2021-10-05 NOTE — PATIENT INSTRUCTIONS
SURVEY:    You may be receiving a survey from [employer] regarding your visit today. Please complete the survey to enable us to provide the highest quality of care to you and your family. If you cannot score us a very good (5 Stars) on any question, please call the office to discuss how we could have made your experience a very good one. Thank you.     Clinical Care Team: JOHN Gaona LPN    Clerical Team: Jose Shin

## 2021-10-18 ENCOUNTER — OFFICE VISIT (OUTPATIENT)
Dept: FAMILY MEDICINE CLINIC | Age: 21
End: 2021-10-18
Payer: COMMERCIAL

## 2021-10-18 VITALS
WEIGHT: 140 LBS | RESPIRATION RATE: 20 BRPM | DIASTOLIC BLOOD PRESSURE: 74 MMHG | BODY MASS INDEX: 25.76 KG/M2 | HEIGHT: 62 IN | HEART RATE: 88 BPM | SYSTOLIC BLOOD PRESSURE: 110 MMHG | OXYGEN SATURATION: 98 %

## 2021-10-18 DIAGNOSIS — H93.8X3 EAR FULLNESS, BILATERAL: ICD-10-CM

## 2021-10-18 DIAGNOSIS — H92.03 ACUTE OTALGIA, BILATERAL: Primary | ICD-10-CM

## 2021-10-18 DIAGNOSIS — H65.93 OME (OTITIS MEDIA WITH EFFUSION), BILATERAL: ICD-10-CM

## 2021-10-18 PROCEDURE — 99213 OFFICE O/P EST LOW 20 MIN: CPT | Performed by: STUDENT IN AN ORGANIZED HEALTH CARE EDUCATION/TRAINING PROGRAM

## 2021-10-18 PROCEDURE — G8427 DOCREV CUR MEDS BY ELIG CLIN: HCPCS | Performed by: STUDENT IN AN ORGANIZED HEALTH CARE EDUCATION/TRAINING PROGRAM

## 2021-10-18 PROCEDURE — 1036F TOBACCO NON-USER: CPT | Performed by: STUDENT IN AN ORGANIZED HEALTH CARE EDUCATION/TRAINING PROGRAM

## 2021-10-18 PROCEDURE — G8419 CALC BMI OUT NRM PARAM NOF/U: HCPCS | Performed by: STUDENT IN AN ORGANIZED HEALTH CARE EDUCATION/TRAINING PROGRAM

## 2021-10-18 PROCEDURE — G8484 FLU IMMUNIZE NO ADMIN: HCPCS | Performed by: STUDENT IN AN ORGANIZED HEALTH CARE EDUCATION/TRAINING PROGRAM

## 2021-10-18 ASSESSMENT — ENCOUNTER SYMPTOMS
NAUSEA: 0
SINUS PAIN: 0
COUGH: 0
WHEEZING: 0
RHINORRHEA: 0
DIARRHEA: 0
BACK PAIN: 0
ABDOMINAL PAIN: 0
VOMITING: 0

## 2021-10-18 NOTE — PROGRESS NOTES
HPI Notes    Name: Jabier Lange  : 2000         Chief Complaint:     Chief Complaint   Patient presents with    Otalgia     Bilateral ear pain onset 1 week mostly the rt ear. feels full  has not taken an OTC medications       History of Present Illness:      HPI    This is a 73-year-old woman presenting for evaluation of bilateral ear pain with the right being worse than the left. This is been ongoing since 1 week ago, and is described as \"feeling full, itchy\". She also describes mild discomfort worsening with manipulation of the pinnae. She has not taken any over-the-counter pain relievers. She denies recent illness, and denies fever, sinus drainage, nasal congestion, postnasal drip, cough, rhinorrhea. She does endorse being ill 2 weeks ago (cough, sore throat, headache, mild fever) which was excluded for EBV, GAS, COVID-19. 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 Past Medical History:     No past medical history on file. Reviewed all health maintenance requirements and ordered appropriate tests  Health Maintenance Due   Topic Date Due    Hepatitis C screen  Never done    Varicella vaccine (1 of 2 - 2-dose childhood series) Never done    HPV vaccine (1 - 2-dose series) Never done    COVID-19 Vaccine (1) Never done    HIV screen  Never done    Chlamydia screen  2021    Flu vaccine (1) Never done       Past Surgical History:     Past Surgical History:   Procedure Laterality Date    HERNIA REPAIR      MYRINGOTOMY AND TYMPANOSTOMY TUBE PLACEMENT          Medications:       Prior to Admission medications    Medication Sig Start Date End Date Taking?  Authorizing Provider   ibuprofen (ADVIL;MOTRIN) 600 MG tablet take 1 tablet by mouth twice a day with food or milk if needed 21  Yes Historical Provider, MD Mireille Nixon 0.3-30 MG-MCG per tablet take 1 tablet by mouth once daily 21  Yes Juan Manuel Lopez DO congestion or rhinorrhea. Mouth/Throat:      Mouth: Mucous membranes are moist.      Pharynx: Oropharynx is clear. No oropharyngeal exudate or posterior oropharyngeal erythema. Eyes:      Conjunctiva/sclera: Conjunctivae normal.   Skin:     General: Skin is warm and dry. Capillary Refill: Capillary refill takes less than 2 seconds. Neurological:      General: No focal deficit present. Mental Status: She is alert and oriented to person, place, and time. Mental status is at baseline. Cranial Nerves: No cranial nerve deficit. Psychiatric:         Mood and Affect: Mood normal.         Behavior: Behavior normal.         Thought Content: Thought content normal.                 Data:     Lab Results   Component Value Date     10/05/2021    K 4.6 10/05/2021     10/05/2021    CO2 23 10/05/2021    BUN 9 10/05/2021    CREATININE 0.81 10/05/2021    GLUCOSE 89 10/05/2021    PROT 6.8 10/05/2021    LABALBU 4.2 10/05/2021    BILITOT 0.52 10/05/2021    ALKPHOS 64 10/05/2021    AST 18 10/05/2021    ALT 18 10/05/2021     Lab Results   Component Value Date    WBC 10.6 10/05/2021    RBC 4.55 10/05/2021    HGB 13.9 10/05/2021    HCT 40.2 10/05/2021    MCV 88.3 10/05/2021    MCH 30.5 10/05/2021    MCHC 34.5 10/05/2021    RDW 12.4 10/05/2021     10/05/2021    MPV NOT REPORTED 10/05/2021     No results found for: TSH  No results found for: CHOL, HDL, PSA, LABA1C       Assessment & Plan        Diagnosis Orders   1. Acute otalgia, bilateral     2. Ear fullness, bilateral     3. OME (otitis media with effusion), bilateral         1. This appears to be bilateral OME, discussed natural history, pathophysiology of this condition. Would recommend treating with cetirizine pseudoephedrine 1 tablet/day for 14 days, follow-up if not improved. We also discussed using phenylephrine in addition to plain cetirizine if she becomes symptomatic from using pseudoephedrine.     Follow up as needed          Completed Refills   Requested Prescriptions      No prescriptions requested or ordered in this encounter     Return if symptoms worsen or fail to improve. No orders of the defined types were placed in this encounter. No orders of the defined types were placed in this encounter. Patient Instructions     Kathy,    I'm not concerned for swimmer's ear. You have some fluid behind both ears, but more so behind the left. I'd recommend you  over the counter cetirizine-pseudoephedrine (Zyrtec-D) and take one a day for 14 days. If it makes you feel anxious and jittery, switch to plain cetirizine (Zyrtec) and take phenylephrine (Sudafed PE) for a decongestant. Call me if you don't improve. Thank you for coming to see me today! It was wonderful to meet you! Please give me a call if you have any other questions or problems, and I will see you at your next visit! Dr. Tata Vaca:    Tirso [employer] may be receiving a survey from [employer] regarding your visit today. Please complete the survey to enable us to provide the highest quality of care to you and your family. If you cannot score us a very good on any question, please call the office to discuss how we could of made your experience a very good one. Thank you. Clinical Care Team:     Dr. Tamiko Forman, [facility]:     [id number] [facility]  Patient Education        Middle Ear Fluid: Care Instructions  Your Care Instructions     Fluid often builds up inside the ear during a cold or allergies. Usually the fluid drains away, but sometimes a small tube in the ear, called the eustachian tube, stays blocked for months. Symptoms of fluid buildup may include:  · Popping, ringing, or a feeling of fullness or pressure in the ear. · Trouble hearing. · Balance problems and dizziness. In most cases, you can treat yourself at home.   Follow-up care is a key part of your treatment and safety. Be sure to make and go to all appointments, and call your doctor if you are having problems. It's also a good idea to know your test results and keep a list of the medicines you take. How can you care for yourself at home? · In most cases, the fluid clears up within a few months without treatment. You may need more tests if the fluid does not clear up after 3 months. · If your doctor prescribed antibiotics, take them as directed. Do not stop taking them just because you feel better. You need to take the full course of antibiotics. When should you call for help? Call your doctor now or seek immediate medical care if:    · You have symptoms of infection, such as:  ? Increased pain, swelling, warmth, or redness. ? Pus draining from the area. ? A fever. Watch closely for changes in your health, and be sure to contact your doctor if:    · You notice changes in hearing.     · You do not get better as expected. Where can you learn more? Go to https://Evolv.Offees. org and sign in to your [employer] account. Enter P889 in the weartolook box to learn more about \"Middle Ear Fluid: Care Instructions. \"     If you do not have an account, please click on the \"Sign Up Now\" link. Current as of: December 2, 2020               Content Version: 13.0  © 2006-2021 Healthwise, Incorporated. Care instructions adapted under license by [facility] ([facility]). If you have questions about a medical condition or this instruction, always ask your healthcare professional. [place] [address] any warranty or liability for your use of this information.              Electronically signed by Carlos Bryant DO on 10/18/2021 at 9:04 AM           Completed Refills   Requested Prescriptions      No prescriptions requested or ordered in this encounter         Kathy received counseling on the following healthy behaviors: nutrition, exercise and medication adherence  Reviewed prior labs and health maintenance. Continue current medications, diet and exercise. Discussed use, benefit, and side effects of prescribed medications. Barriers to medication compliance addressed. Patient given educational materials - see patient instructions. All patient questions answered. Patient voiced understanding.

## 2021-10-18 NOTE — PATIENT INSTRUCTIONS
Kathy,    I'm not concerned for swimmer's ear. You have some fluid behind both ears, but more so behind the left. I'd recommend you  over the counter cetirizine-pseudoephedrine (Zyrtec-D) and take one a day for 14 days. If it makes you feel anxious and jittery, switch to plain cetirizine (Zyrtec) and take phenylephrine (Sudafed PE) for a decongestant. Call me if you don't improve. Thank you for coming to see me today! It was wonderful to meet you! Please give me a call if you have any other questions or problems, and I will see you at your next visit! Dr. Mary Gonzales:    Sammy Ag may be receiving a survey from [employer] regarding your visit today. Please complete the survey to enable us to provide the highest quality of care to you and your family. If you cannot score us a very good on any question, please call the office to discuss how we could of made your experience a very good one. Thank you. Clinical Care Team:     Dr. Nikki Mars, [facility]:     [id number] [facility]  Patient Education        Middle Ear Fluid: Care Instructions  Your Care Instructions     Fluid often builds up inside the ear during a cold or allergies. Usually the fluid drains away, but sometimes a small tube in the ear, called the eustachian tube, stays blocked for months. Symptoms of fluid buildup may include:  · Popping, ringing, or a feeling of fullness or pressure in the ear. · Trouble hearing. · Balance problems and dizziness. In most cases, you can treat yourself at home. Follow-up care is a key part of your treatment and safety. Be sure to make and go to all appointments, and call your doctor if you are having problems. It's also a good idea to know your test results and keep a list of the medicines you take. How can you care for yourself at home? · In most cases, the fluid clears up within a few months without treatment.  You may need more tests if the fluid does not clear up after 3 months. · If your doctor prescribed antibiotics, take them as directed. Do not stop taking them just because you feel better. You need to take the full course of antibiotics. When should you call for help? Call your doctor now or seek immediate medical care if:    · You have symptoms of infection, such as:  ? Increased pain, swelling, warmth, or redness. ? Pus draining from the area. ? A fever. Watch closely for changes in your health, and be sure to contact your doctor if:    · You notice changes in hearing.     · You do not get better as expected. Where can you learn more? Go to https://BlooBoxpepiceweb.FansUnite. org and sign in to your "[employer] account. Enter G608 in the [employer] box to learn more about \"Middle Ear Fluid: Care Instructions. \"     If you do not have an account, please click on the \"Sign Up Now\" link. Current as of: December 2, 2020               Content Version: 13.0  © 2006-2021 Healthwise, Incorporated. Care instructions adapted under license by [facility] ([facility]). If you have questions about a medical condition or this instruction, always ask your healthcare professional. [place] [address] any warranty or liability for your use of this information.

## 2021-12-13 RX ORDER — NORGESTREL AND ETHINYL ESTRADIOL 0.3-0.03MG
KIT ORAL
Qty: 84 TABLET | Refills: 1 | Status: SHIPPED | OUTPATIENT
Start: 2021-12-13 | End: 2022-07-20

## 2022-06-27 RX ORDER — CITALOPRAM 40 MG/1
TABLET ORAL
Qty: 90 TABLET | Refills: 1 | Status: SHIPPED | OUTPATIENT
Start: 2022-06-27 | End: 2022-10-19 | Stop reason: ALTCHOICE

## 2022-06-27 NOTE — TELEPHONE ENCOUNTER
Last OV: 10/18/2021 aman   Last RX:    Next scheduled apt: Visit date not found          surescript requesting a refill

## 2022-07-20 RX ORDER — NORGESTREL AND ETHINYL ESTRADIOL 0.3-0.03MG
KIT ORAL
Qty: 56 TABLET | Refills: 5 | Status: SHIPPED | OUTPATIENT
Start: 2022-07-20

## 2022-07-20 NOTE — TELEPHONE ENCOUNTER
Last OV: 10/18/2021 bilateral ear  08/12/21 physical   Last RX:    Next scheduled apt: Visit date not found         Surescript requesting a refill

## 2022-07-26 ENCOUNTER — OFFICE VISIT (OUTPATIENT)
Dept: FAMILY MEDICINE CLINIC | Age: 22
End: 2022-07-26
Payer: COMMERCIAL

## 2022-07-26 ENCOUNTER — HOSPITAL ENCOUNTER (OUTPATIENT)
Age: 22
Discharge: HOME | End: 2022-07-26
Payer: COMMERCIAL

## 2022-07-26 VITALS
BODY MASS INDEX: 27.16 KG/M2 | WEIGHT: 147.6 LBS | SYSTOLIC BLOOD PRESSURE: 110 MMHG | DIASTOLIC BLOOD PRESSURE: 80 MMHG | HEART RATE: 76 BPM | OXYGEN SATURATION: 97 % | HEIGHT: 62 IN | RESPIRATION RATE: 20 BRPM

## 2022-07-26 DIAGNOSIS — K58.0 IRRITABLE BOWEL SYNDROME WITH DIARRHEA: Primary | ICD-10-CM

## 2022-07-26 DIAGNOSIS — K21.9 GASTROESOPHAGEAL REFLUX DISEASE WITHOUT ESOPHAGITIS: ICD-10-CM

## 2022-07-26 DIAGNOSIS — K58.0 IRRITABLE BOWEL SYNDROME WITH DIARRHEA: ICD-10-CM

## 2022-07-26 LAB
ABSOLUTE EOS #: 0.3 K/UL (ref 0–0.4)
ABSOLUTE LYMPH #: 2.3 K/UL (ref 1–4.8)
ABSOLUTE MONO #: 0.6 K/UL (ref 0–1)
ALBUMIN SERPL-MCNC: 4.3 G/DL (ref 3.5–5.2)
ALP BLD-CCNC: 52 U/L (ref 35–104)
ALT SERPL-CCNC: 19 U/L (ref 5–33)
ANION GAP SERPL CALCULATED.3IONS-SCNC: 12 MMOL/L (ref 9–17)
AST SERPL-CCNC: 17 U/L
BASOPHILS # BLD: 0 % (ref 0–2)
BASOPHILS ABSOLUTE: 0 K/UL (ref 0–0.2)
BILIRUB SERPL-MCNC: 0.43 MG/DL (ref 0.3–1.2)
BUN BLDV-MCNC: 12 MG/DL (ref 6–20)
BUN/CREAT BLD: 14 (ref 9–20)
CALCIUM SERPL-MCNC: 9 MG/DL (ref 8.6–10.4)
CHLORIDE BLD-SCNC: 103 MMOL/L (ref 98–107)
CO2: 25 MMOL/L (ref 20–31)
CREAT SERPL-MCNC: 0.84 MG/DL (ref 0.5–0.9)
DIFFERENTIAL TYPE: YES
EOSINOPHILS RELATIVE PERCENT: 3 % (ref 0–5)
GFR AFRICAN AMERICAN: >60 ML/MIN
GFR NON-AFRICAN AMERICAN: >60 ML/MIN
GFR SERPL CREATININE-BSD FRML MDRD: NORMAL ML/MIN/{1.73_M2}
GLUCOSE BLD-MCNC: 84 MG/DL (ref 70–99)
HCT VFR BLD CALC: 41.1 % (ref 36–46)
HEMOGLOBIN: 13.9 G/DL (ref 12–16)
LYMPHOCYTES # BLD: 24 % (ref 15–40)
MCH RBC QN AUTO: 29.7 PG (ref 26–34)
MCHC RBC AUTO-ENTMCNC: 33.9 G/DL (ref 31–37)
MCV RBC AUTO: 87.7 FL (ref 80–100)
MONOCYTES # BLD: 6 % (ref 4–8)
PDW BLD-RTO: 12.3 % (ref 12.1–15.2)
PLATELET # BLD: 303 K/UL (ref 140–450)
POTASSIUM SERPL-SCNC: 4.4 MMOL/L (ref 3.7–5.3)
RBC # BLD: 4.69 M/UL (ref 4–5.2)
SEG NEUTROPHILS: 67 % (ref 47–75)
SEGMENTED NEUTROPHILS ABSOLUTE COUNT: 6.4 K/UL (ref 2.5–7)
SODIUM BLD-SCNC: 140 MMOL/L (ref 135–144)
TOTAL PROTEIN: 6.7 G/DL (ref 6.4–8.3)
WBC # BLD: 9.6 K/UL (ref 4.5–13.5)

## 2022-07-26 PROCEDURE — G8419 CALC BMI OUT NRM PARAM NOF/U: HCPCS | Performed by: NURSE PRACTITIONER

## 2022-07-26 PROCEDURE — 82784 ASSAY IGA/IGD/IGG/IGM EACH: CPT

## 2022-07-26 PROCEDURE — 83516 IMMUNOASSAY NONANTIBODY: CPT

## 2022-07-26 PROCEDURE — 99213 OFFICE O/P EST LOW 20 MIN: CPT | Performed by: NURSE PRACTITIONER

## 2022-07-26 PROCEDURE — 1036F TOBACCO NON-USER: CPT | Performed by: NURSE PRACTITIONER

## 2022-07-26 PROCEDURE — 80053 COMPREHEN METABOLIC PANEL: CPT

## 2022-07-26 PROCEDURE — G8427 DOCREV CUR MEDS BY ELIG CLIN: HCPCS | Performed by: NURSE PRACTITIONER

## 2022-07-26 PROCEDURE — 36415 COLL VENOUS BLD VENIPUNCTURE: CPT

## 2022-07-26 PROCEDURE — 85025 COMPLETE CBC W/AUTO DIFF WBC: CPT

## 2022-07-26 RX ORDER — DICYCLOMINE HYDROCHLORIDE 10 MG/1
10 CAPSULE ORAL
Qty: 120 CAPSULE | Refills: 0 | Status: SHIPPED | OUTPATIENT
Start: 2022-07-26 | End: 2022-08-22

## 2022-07-26 SDOH — ECONOMIC STABILITY: FOOD INSECURITY: WITHIN THE PAST 12 MONTHS, THE FOOD YOU BOUGHT JUST DIDN'T LAST AND YOU DIDN'T HAVE MONEY TO GET MORE.: NEVER TRUE

## 2022-07-26 SDOH — ECONOMIC STABILITY: FOOD INSECURITY: WITHIN THE PAST 12 MONTHS, YOU WORRIED THAT YOUR FOOD WOULD RUN OUT BEFORE YOU GOT MONEY TO BUY MORE.: NEVER TRUE

## 2022-07-26 ASSESSMENT — PATIENT HEALTH QUESTIONNAIRE - PHQ9
9. THOUGHTS THAT YOU WOULD BE BETTER OFF DEAD, OR OF HURTING YOURSELF: 0
7. TROUBLE CONCENTRATING ON THINGS, SUCH AS READING THE NEWSPAPER OR WATCHING TELEVISION: 0
4. FEELING TIRED OR HAVING LITTLE ENERGY: 0
SUM OF ALL RESPONSES TO PHQ QUESTIONS 1-9: 0
SUM OF ALL RESPONSES TO PHQ9 QUESTIONS 1 & 2: 0
1. LITTLE INTEREST OR PLEASURE IN DOING THINGS: 0
6. FEELING BAD ABOUT YOURSELF - OR THAT YOU ARE A FAILURE OR HAVE LET YOURSELF OR YOUR FAMILY DOWN: 0
10. IF YOU CHECKED OFF ANY PROBLEMS, HOW DIFFICULT HAVE THESE PROBLEMS MADE IT FOR YOU TO DO YOUR WORK, TAKE CARE OF THINGS AT HOME, OR GET ALONG WITH OTHER PEOPLE: 0
3. TROUBLE FALLING OR STAYING ASLEEP: 0
SUM OF ALL RESPONSES TO PHQ QUESTIONS 1-9: 0
SUM OF ALL RESPONSES TO PHQ QUESTIONS 1-9: 0
8. MOVING OR SPEAKING SO SLOWLY THAT OTHER PEOPLE COULD HAVE NOTICED. OR THE OPPOSITE, BEING SO FIGETY OR RESTLESS THAT YOU HAVE BEEN MOVING AROUND A LOT MORE THAN USUAL: 0
2. FEELING DOWN, DEPRESSED OR HOPELESS: 0
SUM OF ALL RESPONSES TO PHQ QUESTIONS 1-9: 0
5. POOR APPETITE OR OVEREATING: 0

## 2022-07-26 ASSESSMENT — ENCOUNTER SYMPTOMS
SHORTNESS OF BREATH: 0
ABDOMINAL PAIN: 1
ANAL BLEEDING: 0
DIARRHEA: 1
BLOOD IN STOOL: 0
CONSTIPATION: 0
VOMITING: 0
NAUSEA: 0
COUGH: 0

## 2022-07-26 ASSESSMENT — SOCIAL DETERMINANTS OF HEALTH (SDOH): HOW HARD IS IT FOR YOU TO PAY FOR THE VERY BASICS LIKE FOOD, HOUSING, MEDICAL CARE, AND HEATING?: NOT HARD AT ALL

## 2022-07-26 NOTE — PROGRESS NOTES
HPI Notes    Name: Glendy [place]  : 2000         Chief Complaint:     Chief Complaint   Patient presents with    Abdominal Pain     States she has always had ABD pain but has recently gotten worse states after she eats she has 3 BM (loose Stool)       History of Present Illness:        HPI  Pt is a 26 yo female who reports for abd pain. Pt has struggled with abd pain off and on for several years, but about 3 months ago symptoms changed. Noticed that right after a meal, especially pasta, salad, bread, she has diarrhea 3-4 times the rest of the abd. Feels lower stomach cramps and pain. Past Medical History:     No past medical history on file. Reviewed all health maintenance requirements and ordered appropriate tests  Health Maintenance Due   Topic Date Due    COVID-19 Vaccine (1) Never done    Varicella vaccine (1 of 2 - 2-dose childhood series) Never done    HPV vaccine (1 - 2-dose series) Never done    HIV screen  Never done    Hepatitis C screen  Never done    Chlamydia screen  2021    Depression Monitoring  2022    Pap smear  Never done       Past Surgical History:     Past Surgical History:   Procedure Laterality Date    HERNIA REPAIR      MYRINGOTOMY AND TYMPANOSTOMY TUBE PLACEMENT          Medications:       Prior to Admission medications    Medication Sig Start Date End Date Taking?  Authorizing Provider   dicyclomine (BENTYL) 10 MG capsule Take 1 capsule by mouth 4 times daily (before meals and nightly) 22  Yes ALEXIS Barger CNP   ELINEST 0.3-30 MG-MCG per tablet take 1 tablet by mouth once daily 22  Yes ALEXIS Barger CNP   citalopram (CELEXA) 40 MG tablet take 1 tablet by mouth once daily 22  Yes John Sears DO   omeprazole (PRILOSEC) 40 MG delayed release capsule Take 1 capsule by mouth every morning (before breakfast) 21  Yes ALEXIS Barger CNP   ibuprofen (ADVIL;MOTRIN) 600 MG tablet take 1 tablet by mouth twice a day with food or milk if needed  Patient not taking: Reported on 7/26/2022 9/9/21   Historical Provider, MD        Allergies:       Bee venom and Pcn [penicillins]    Social History:     Tobacco:    reports that she has never smoked. She has never used smokeless tobacco.  Alcohol:      has no history on file for alcohol use. Drug Use:  has no history on file for drug use. Family History:     No family history on file. Review of Systems:         Review of Systems   Constitutional:  Negative for chills and fever. Respiratory:  Negative for cough and shortness of breath. Cardiovascular:  Negative for chest pain and palpitations. Gastrointestinal:  Positive for abdominal pain and diarrhea. Negative for anal bleeding, blood in stool, constipation, nausea and vomiting. Neurological:  Negative for dizziness, seizures and headaches. Physical Exam:     Vitals:  /80 (Site: Left Upper Arm, Position: Sitting)   Pulse 76   Resp 20   Ht 5' 2\" (1.575 m)   Wt 147 lb 9.6 oz (67 kg)   SpO2 97%   BMI 27.00 kg/m²       Physical Exam  Vitals and nursing note reviewed. Constitutional:       Appearance: Normal appearance. She is well-developed. Cardiovascular:      Rate and Rhythm: Normal rate and regular rhythm. Heart sounds: Normal heart sounds, S1 normal and S2 normal.   Pulmonary:      Effort: Pulmonary effort is normal. No respiratory distress. Breath sounds: Normal breath sounds. Abdominal:      General: Bowel sounds are normal.      Palpations: Abdomen is soft. Tenderness: There is generalized abdominal tenderness. Skin:     General: Skin is warm and dry. Neurological:      Mental Status: She is alert and oriented to person, place, and time.              Data:     Lab Results   Component Value Date/Time     07/26/2022 09:15 AM    K 4.4 07/26/2022 09:15 AM     07/26/2022 09:15 AM    CO2 25 07/26/2022 09:15 AM    BUN 12 07/26/2022 09:15 AM    CREATININE 0.84 07/26/2022 09:15 AM    GLUCOSE 84 07/26/2022 09:15 AM    PROT 6.7 07/26/2022 09:15 AM    LABALBU 4.3 07/26/2022 09:15 AM    BILITOT 0.43 07/26/2022 09:15 AM    ALKPHOS 52 07/26/2022 09:15 AM    AST 17 07/26/2022 09:15 AM    ALT 19 07/26/2022 09:15 AM     Lab Results   Component Value Date/Time    WBC 9.6 07/26/2022 09:15 AM    RBC 4.69 07/26/2022 09:15 AM    HGB 13.9 07/26/2022 09:15 AM    HCT 41.1 07/26/2022 09:15 AM    MCV 87.7 07/26/2022 09:15 AM    MCH 29.7 07/26/2022 09:15 AM    MCHC 33.9 07/26/2022 09:15 AM    RDW 12.3 07/26/2022 09:15 AM     07/26/2022 09:15 AM    MPV NOT REPORTED 10/05/2021 09:47 AM     No results found for: TSH  No results found for: CHOL, LDL, HDL, PSA, LABA1C       Assessment & Plan        Diagnosis Orders   1. Irritable bowel syndrome with diarrhea  Celiac Reflex Panel    Comprehensive Metabolic Panel    CBC with Auto Differential      2. Gastroesophageal reflux disease without esophagitis  Celiac Reflex Panel    Comprehensive Metabolic Panel    CBC with Auto Differential        Will send for celiac panel and other labs. Will give bentyl AC&HS. Pt educated about medication. Patient verbalizes understanding and agreement with plan. All questions answered. If symptoms do not resolve or worsen, return to office. Completed Refills   Requested Prescriptions     Signed Prescriptions Disp Refills    dicyclomine (BENTYL) 10 MG capsule 120 capsule 0     Sig: Take 1 capsule by mouth 4 times daily (before meals and nightly)     Return in about 4 weeks (around 8/23/2022).   Orders Placed This Encounter   Medications    dicyclomine (BENTYL) 10 MG capsule     Sig: Take 1 capsule by mouth 4 times daily (before meals and nightly)     Dispense:  120 capsule     Refill:  0     Orders Placed This Encounter   Procedures    Celiac Reflex Panel     Standing Status:   Future     Number of Occurrences:   1     Standing Expiration Date:   7/26/2023    Comprehensive Metabolic Panel Standing Status:   Future     Number of Occurrences:   1     Standing Expiration Date:   8/30/2023    CBC with Auto Differential     Standing Status:   Future     Number of Occurrences:   1     Standing Expiration Date:   8/30/2023         There are no Patient Instructions on file for this visit.     Electronically signed by ALEXIS Valle CNP on 7/26/2022 at 12:40 PM           Completed Refills   Requested Prescriptions     Signed Prescriptions Disp Refills    dicyclomine (BENTYL) 10 MG capsule 120 capsule 0     Sig: Take 1 capsule by mouth 4 times daily (before meals and nightly)

## 2022-07-27 LAB
GLIADIN DEAMINIDATED PEPTIDE AB IGA: 0.6 U/ML
GLIADIN DEAMINIDATED PEPTIDE AB IGG: <0.4 U/ML
IGA: 159 MG/DL (ref 70–400)
TISSUE TRANSGLUTAMINASE ANTIBODY IGG: <0.6 U/ML
TISSUE TRANSGLUTAMINASE IGA: 0.4 U/ML

## 2022-08-22 RX ORDER — DICYCLOMINE HYDROCHLORIDE 10 MG/1
CAPSULE ORAL
Qty: 120 CAPSULE | Refills: 0 | Status: SHIPPED | OUTPATIENT
Start: 2022-08-22 | End: 2022-09-24

## 2022-08-30 ENCOUNTER — TELEPHONE (OUTPATIENT)
Dept: FAMILY MEDICINE CLINIC | Age: 22
End: 2022-08-30

## 2022-08-30 ENCOUNTER — OFFICE VISIT (OUTPATIENT)
Dept: FAMILY MEDICINE CLINIC | Age: 22
End: 2022-08-30
Payer: COMMERCIAL

## 2022-08-30 VITALS
SYSTOLIC BLOOD PRESSURE: 100 MMHG | HEART RATE: 70 BPM | TEMPERATURE: 98.7 F | DIASTOLIC BLOOD PRESSURE: 60 MMHG | OXYGEN SATURATION: 98 %

## 2022-08-30 DIAGNOSIS — K58.0 IRRITABLE BOWEL SYNDROME WITH DIARRHEA: ICD-10-CM

## 2022-08-30 DIAGNOSIS — F41.1 GAD (GENERALIZED ANXIETY DISORDER): Primary | ICD-10-CM

## 2022-08-30 PROBLEM — K21.9 GASTROESOPHAGEAL REFLUX DISEASE WITHOUT ESOPHAGITIS: Status: RESOLVED | Noted: 2022-07-26 | Resolved: 2022-08-30

## 2022-08-30 PROCEDURE — 99214 OFFICE O/P EST MOD 30 MIN: CPT | Performed by: NURSE PRACTITIONER

## 2022-08-30 PROCEDURE — G8419 CALC BMI OUT NRM PARAM NOF/U: HCPCS | Performed by: NURSE PRACTITIONER

## 2022-08-30 PROCEDURE — 1036F TOBACCO NON-USER: CPT | Performed by: NURSE PRACTITIONER

## 2022-08-30 PROCEDURE — G8427 DOCREV CUR MEDS BY ELIG CLIN: HCPCS | Performed by: NURSE PRACTITIONER

## 2022-08-30 RX ORDER — SERTRALINE HYDROCHLORIDE 25 MG/1
25 TABLET, FILM COATED ORAL DAILY
Qty: 30 TABLET | Refills: 0 | Status: SHIPPED | OUTPATIENT
Start: 2022-08-30 | End: 2022-09-24

## 2022-08-30 ASSESSMENT — ENCOUNTER SYMPTOMS
NAUSEA: 0
COUGH: 0
VOMITING: 0
SHORTNESS OF BREATH: 0
DIARRHEA: 0

## 2022-08-30 NOTE — PROGRESS NOTES
HPI Notes    Name: Rico Aguirre  : 2000         Chief Complaint:     Chief Complaint   Patient presents with    Mental Health Problem     Patient here today to discuss changing her citalopram for her anxiety. Medication does not seem to be helping. History of Present Illness:        HPI  Pt is a 26 yo female who presents for follow on anxiety. Pt has been on celexa for the past 2 years. Dose has been adjusted from 10mg to 20mg to current dose of 40mg. Pt states that she feels a lot of anxiety while in class but also in general while at college. Does not seem like celexa is working anymore. IBS-D  Pt was started on bentyl for IBS-D, abd pain, and bloating. Pt states that she no longer takes omeprazole. Takes Bentyl before each meal and has not had any problems. Has a BM once a day and it is soft, formed. Still has some bloating and gets occasional abd pain if she \"eats poorly\". Past Medical History:     No past medical history on file. Reviewed all health maintenance requirements and ordered appropriate tests  Health Maintenance Due   Topic Date Due    COVID-19 Vaccine (1) Never done    Varicella vaccine (1 of 2 - 2-dose childhood series) Never done    HPV vaccine (1 - 2-dose series) Never done    HIV screen  Never done    Hepatitis C screen  Never done    Chlamydia screen  2021    Pap smear  Never done       Past Surgical History:     Past Surgical History:   Procedure Laterality Date    HERNIA REPAIR      MYRINGOTOMY AND TYMPANOSTOMY TUBE PLACEMENT          Medications:       Prior to Admission medications    Medication Sig Start Date End Date Taking?  Authorizing Provider   sertraline (ZOLOFT) 25 MG tablet Take 1 tablet by mouth daily 22  Yes ALEXIS Lockett CNP   rifAXIMin (XIFAXAN) 550 MG tablet Take 1 tablet by mouth 3 times daily for 14 days 22 Yes ALEXIS Lockett CNP   dicyclomine (BENTYL) 10 MG capsule take 1 capsule by mouth four 140 07/26/2022 09:15 AM    K 4.4 07/26/2022 09:15 AM     07/26/2022 09:15 AM    CO2 25 07/26/2022 09:15 AM    BUN 12 07/26/2022 09:15 AM    CREATININE 0.84 07/26/2022 09:15 AM    GLUCOSE 84 07/26/2022 09:15 AM    PROT 6.7 07/26/2022 09:15 AM    LABALBU 4.3 07/26/2022 09:15 AM    BILITOT 0.43 07/26/2022 09:15 AM    ALKPHOS 52 07/26/2022 09:15 AM    AST 17 07/26/2022 09:15 AM    ALT 19 07/26/2022 09:15 AM     Lab Results   Component Value Date/Time    WBC 9.6 07/26/2022 09:15 AM    RBC 4.69 07/26/2022 09:15 AM    HGB 13.9 07/26/2022 09:15 AM    HCT 41.1 07/26/2022 09:15 AM    MCV 87.7 07/26/2022 09:15 AM    MCH 29.7 07/26/2022 09:15 AM    MCHC 33.9 07/26/2022 09:15 AM    RDW 12.3 07/26/2022 09:15 AM     07/26/2022 09:15 AM    MPV NOT REPORTED 10/05/2021 09:47 AM     No results found for: TSH  No results found for: CHOL, LDL, HDL, PSA, LABA1C       Assessment & Plan        Diagnosis Orders   1. MAVIS (generalized anxiety disorder)  --will discontinue celexa and change to zoloft 25mg daily. Pt educated about medication. 2. Irritable bowel syndrome with diarrhea   --doing better with bentyl but problem may be related to overgrown of bacteria in the gut. Will do 2 week treatment of xifaxin 550mg TID. Pt educated about medication. Patient verbalizes understanding and agreement with plan. All questions answered. If symptoms do not resolve or worsen, return to office. Completed Refills   Requested Prescriptions     Signed Prescriptions Disp Refills    sertraline (ZOLOFT) 25 MG tablet 30 tablet 0     Sig: Take 1 tablet by mouth daily    rifAXIMin (XIFAXAN) 550 MG tablet 42 tablet 0     Sig: Take 1 tablet by mouth 3 times daily for 14 days     No follow-ups on file.   Orders Placed This Encounter   Medications    sertraline (ZOLOFT) 25 MG tablet     Sig: Take 1 tablet by mouth daily     Dispense:  30 tablet     Refill:  0    rifAXIMin (XIFAXAN) 550 MG tablet     Sig: Take 1 tablet by mouth 3 times daily for 14 days     Dispense:  42 tablet     Refill:  0     No orders of the defined types were placed in this encounter. Patient Instructions   SURVEY:    You may be receiving a survey from [employer] regarding your visit today. Please complete the survey to enable us to provide the highest quality of care to you and your family. If you cannot score us a very good (5 Stars) on any question, please call the office to discuss how we could have made your experience a very good one. Thank you.     Clinical Care Team: JOHN Gary LPN    Clerical Team: Peng Nelson   Electronically signed by ALEXIS Gary CNP on 8/30/2022 at 10:36 AM           Completed Refills   Requested Prescriptions     Signed Prescriptions Disp Refills    sertraline (ZOLOFT) 25 MG tablet 30 tablet 0     Sig: Take 1 tablet by mouth daily    rifAXIMin (XIFAXAN) 550 MG tablet 42 tablet 0     Sig: Take 1 tablet by mouth 3 times daily for 14 days

## 2022-08-30 NOTE — TELEPHONE ENCOUNTER
Saurabh Lara called from [facility] with a question about pt's medication. Should Celexa be D/C or is pt taking Celexa and Zoloft ?  Please advise

## 2022-08-30 NOTE — PATIENT INSTRUCTIONS
SURVEY:    You may be receiving a survey from [employer] regarding your visit today. Please complete the survey to enable us to provide the highest quality of care to you and your family. If you cannot score us a very good (5 Stars) on any question, please call the office to discuss how we could have made your experience a very good one. Thank you.     Clinical Care Team: ALEXIS Arenas-TRINITY Bhakta LPN    Clerical Team: Baudilio Wolf

## 2022-09-24 ENCOUNTER — HOSPITAL ENCOUNTER (EMERGENCY)
Age: 22
Discharge: HOME | End: 2022-09-24
Attending: FAMILY MEDICINE
Payer: COMMERCIAL

## 2022-09-24 ENCOUNTER — APPOINTMENT (OUTPATIENT)
Dept: CT IMAGING | Age: 22
End: 2022-09-24
Payer: COMMERCIAL

## 2022-09-24 VITALS
DIASTOLIC BLOOD PRESSURE: 65 MMHG | SYSTOLIC BLOOD PRESSURE: 111 MMHG | HEART RATE: 80 BPM | TEMPERATURE: 98.5 F | RESPIRATION RATE: 18 BRPM | BODY MASS INDEX: 28.19 KG/M2 | HEIGHT: 62 IN | WEIGHT: 153.2 LBS | OXYGEN SATURATION: 97 %

## 2022-09-24 DIAGNOSIS — N83.201 CYSTS OF BOTH OVARIES: Primary | ICD-10-CM

## 2022-09-24 DIAGNOSIS — N83.202 CYSTS OF BOTH OVARIES: Primary | ICD-10-CM

## 2022-09-24 LAB
-: ABNORMAL
ABSOLUTE EOS #: 0.2 K/UL (ref 0–0.4)
ABSOLUTE LYMPH #: 2.7 K/UL (ref 1–4.8)
ABSOLUTE MONO #: 0.5 K/UL (ref 0–1)
ALBUMIN SERPL-MCNC: 4.2 G/DL (ref 3.5–5.2)
ALP BLD-CCNC: 73 U/L (ref 35–104)
ALT SERPL-CCNC: 20 U/L (ref 5–33)
ANION GAP SERPL CALCULATED.3IONS-SCNC: 11 MMOL/L (ref 9–17)
AST SERPL-CCNC: 16 U/L
BACTERIA: ABNORMAL
BASOPHILS # BLD: 0 % (ref 0–2)
BASOPHILS ABSOLUTE: 0 K/UL (ref 0–0.2)
BILIRUB SERPL-MCNC: 0.4 MG/DL (ref 0.3–1.2)
BILIRUBIN URINE: NEGATIVE
BUN BLDV-MCNC: 12 MG/DL (ref 6–20)
BUN/CREAT BLD: 16 (ref 9–20)
CALCIUM SERPL-MCNC: 9 MG/DL (ref 8.6–10.4)
CHLORIDE BLD-SCNC: 106 MMOL/L (ref 98–107)
CO2: 22 MMOL/L (ref 20–31)
COLOR: YELLOW
COMMENT UA: ABNORMAL
CREAT SERPL-MCNC: 0.73 MG/DL (ref 0.5–0.9)
DIFFERENTIAL TYPE: YES
EOSINOPHILS RELATIVE PERCENT: 2 % (ref 0–5)
EPITHELIAL CELLS UA: ABNORMAL /HPF
GFR AFRICAN AMERICAN: >60 ML/MIN
GFR NON-AFRICAN AMERICAN: >60 ML/MIN
GFR SERPL CREATININE-BSD FRML MDRD: NORMAL ML/MIN/{1.73_M2}
GLUCOSE BLD-MCNC: 94 MG/DL (ref 70–99)
GLUCOSE URINE: NEGATIVE
HCG QUALITATIVE: NEGATIVE
HCT VFR BLD CALC: 41.9 % (ref 36–46)
HEMOGLOBIN: 14.5 G/DL (ref 12–16)
KETONES, URINE: NEGATIVE
LEUKOCYTE ESTERASE, URINE: NEGATIVE
LIPASE: 19 U/L (ref 13–60)
LYMPHOCYTES # BLD: 25 % (ref 15–40)
MCH RBC QN AUTO: 30.1 PG (ref 26–34)
MCHC RBC AUTO-ENTMCNC: 34.5 G/DL (ref 31–37)
MCV RBC AUTO: 87.3 FL (ref 80–100)
MONOCYTES # BLD: 5 % (ref 4–8)
NITRITE, URINE: NEGATIVE
PDW BLD-RTO: 12.5 % (ref 12.1–15.2)
PH UA: 6 (ref 5–8)
PLATELET # BLD: 281 K/UL (ref 140–450)
POTASSIUM SERPL-SCNC: 4.2 MMOL/L (ref 3.7–5.3)
PROTEIN UA: NEGATIVE
RBC # BLD: 4.8 M/UL (ref 4–5.2)
RBC UA: ABNORMAL /HPF (ref 0–2)
SEG NEUTROPHILS: 68 % (ref 47–75)
SEGMENTED NEUTROPHILS ABSOLUTE COUNT: 7.4 K/UL (ref 2.5–7)
SODIUM BLD-SCNC: 139 MMOL/L (ref 135–144)
SPECIFIC GRAVITY UA: 1.01 (ref 1–1.03)
TOTAL PROTEIN: 6.7 G/DL (ref 6.4–8.3)
TURBIDITY: ABNORMAL
URINE HGB: ABNORMAL
UROBILINOGEN, URINE: NORMAL
WBC # BLD: 10.9 K/UL (ref 4.5–13.5)
WBC UA: ABNORMAL /HPF

## 2022-09-24 PROCEDURE — 99285 EMERGENCY DEPT VISIT HI MDM: CPT

## 2022-09-24 PROCEDURE — 6360000004 HC RX CONTRAST MEDICATION: Performed by: FAMILY MEDICINE

## 2022-09-24 PROCEDURE — 81001 URINALYSIS AUTO W/SCOPE: CPT

## 2022-09-24 PROCEDURE — 85025 COMPLETE CBC W/AUTO DIFF WBC: CPT

## 2022-09-24 PROCEDURE — 84703 CHORIONIC GONADOTROPIN ASSAY: CPT

## 2022-09-24 PROCEDURE — 80053 COMPREHEN METABOLIC PANEL: CPT

## 2022-09-24 PROCEDURE — 83690 ASSAY OF LIPASE: CPT

## 2022-09-24 PROCEDURE — 74177 CT ABD & PELVIS W/CONTRAST: CPT

## 2022-09-24 RX ADMIN — IOPAMIDOL 75 ML: 755 INJECTION, SOLUTION INTRAVENOUS at 10:54

## 2022-09-24 ASSESSMENT — PAIN DESCRIPTION - LOCATION: LOCATION: ABDOMEN

## 2022-09-24 ASSESSMENT — PAIN SCALES - GENERAL: PAINLEVEL_OUTOF10: 7

## 2022-09-24 ASSESSMENT — PAIN DESCRIPTION - DESCRIPTORS: DESCRIPTORS: SHARP

## 2022-09-24 ASSESSMENT — PAIN - FUNCTIONAL ASSESSMENT: PAIN_FUNCTIONAL_ASSESSMENT: 0-10

## 2022-09-24 ASSESSMENT — PAIN DESCRIPTION - FREQUENCY: FREQUENCY: CONTINUOUS

## 2022-09-24 ASSESSMENT — PAIN DESCRIPTION - ORIENTATION: ORIENTATION: RIGHT;LOWER

## 2022-09-24 ASSESSMENT — PAIN DESCRIPTION - PAIN TYPE: TYPE: ACUTE PAIN

## 2022-09-24 NOTE — ED PROVIDER NOTES
eMERGENCY dEPARTMENT eNCOUnter      [facility]    Chief Complaint   Patient presents with    Abdominal Pain     RLQ pain for 2 days. HPI    Harley Petty is a 25 y.o. female who presents with right lower quadrant abdominal pain for 2 days. Symptoms are described as being moderate in severity. Pain is worse with motion. Pain is relieved with rest.  Appetite has been decreased since this pain began. REVIEW OF SYSTEMS    All systems reviewed and positives are in the HPI. PAST MEDICAL HISTORY    History reviewed. No pertinent past medical history. SURGICAL HISTORY    Past Surgical History:   Procedure Laterality Date    HERNIA REPAIR      MYRINGOTOMY AND TYMPANOSTOMY TUBE PLACEMENT         CURRENT MEDICATIONS    Current Outpatient Rx   Medication Sig Dispense Refill    ELINEST 0.3-30 MG-MCG per tablet take 1 tablet by mouth once daily 56 tablet 5    citalopram (CELEXA) 40 MG tablet take 1 tablet by mouth once daily 90 tablet 1       ALLERGIES    Allergies   Allergen Reactions    Bee Venom     Pcn [Penicillins] Hives       FAMILY HISTORY    History reviewed. No pertinent family history.     SOCIAL HISTORY    Social History     Socioeconomic History    Marital status: Single     Spouse name: None    Number of children: None    Years of education: None    Highest education level: None   Tobacco Use    Smoking status: Never    Smokeless tobacco: Never   Vaping Use    Vaping Use: Never used   Substance and Sexual Activity    Alcohol use: Not Currently    Drug use: Never    Sexual activity: Not Currently     Social Determinants of Health     Financial Resource Strain: Low Risk     Difficulty of Paying Living Expenses: Not hard at all   Food Insecurity: No Food Insecurity    Worried About [address] in the Last Year: Never true    Ran Out of Food in the Last Year: Never true       PHYSICAL EXAM    VITAL SIGNS: /65   Pulse 80   Temp 98.5 °F (36.9 °C) (Oral)   Resp 18   Ht 5' 2\" (1.575 m)   Wt 153 lb 3.2 oz (69.5 kg)   LMP 09/12/2022 (Approximate)   SpO2 97%   BMI 28.02 kg/m²   Constitutional:  Well developed, well nourished, moderate acute distress, non-toxic appearance   Eyes:  PERRL, conjunctiva normal   HENT:  Atraumatic, external ears normal, nose normal, oropharynx moist. Neck supple   Respiratory:  No respiratory distress, normal breath sounds   Cardiovascular:  Normal rate, normal rhythm, no murmurs, no gallops, no rubs   GI: Abdomen soft, right lower quadrant tenderness, no masses, umbilical hernia present, bowel sounds positive. : No CVA tenderness  Musculoskeletal:  No edema   Integument:  Well hydrated   Neurologic:  Alert & oriented x 3, no focal deficits     EKG        RADIOLOGY/PROCEDURES      CT of abdomen and pelvis revealed suggestion of a possible 1.9 cm superficial soft tissue/skin mass at the umbilicus. Correlate with physical exam.  Patient was examined and she does have an umbilical hernia in this area. Near fluid density 3.3 cm left adnexal probable cyst.  Probable corpus luteum cyst at the right adnexa measuring 1.5 cm. Further evaluation with ultrasound if clinically warranted. Appendix measures up to 6 to 7 mm in diameter but contains stool and gas. No periappendiceal fat stranding or fluid collection. This is favored to be within normal limits. ED COURSE & MEDICAL DECISION MAKING    Pertinent Labs & Imaging studies reviewed.  (See chart for details)   Labs Reviewed   CBC WITH AUTO DIFFERENTIAL - Abnormal; Notable for the following components:       Result Value    Segs Absolute 7.40 (*)     All other components within normal limits   URINALYSIS - Abnormal; Notable for the following components:    Turbidity UA Hazy (*)     Urine Hgb TRACE (*)     All other components within normal limits   MICROSCOPIC URINALYSIS - Abnormal; Notable for the following components:    Bacteria, UA 1+ (*)     All other components within normal limits   COMPREHENSIVE METABOLIC PANEL   LIPASE   HCG, SERUM, QUALITATIVE     Patient was stable on discharge. She will follow-up with Dr. Wilfredo Yu for possible pelvic ultrasound. FINAL IMPRESSION    1. Ovarian cysts  2. Summation      Patient Course: Patient was stable on discharge. ED Medications administered this visit:    Medications   iopamidol (ISOVUE-370) 76 % injection 75 mL (75 mLs IntraVENous Given 9/24/22 1054)       New Prescriptions from this visit:    Discharge Medication List as of 9/24/2022 12:16 PM          Follow-up:  Alice Cole MD  [address] [place] [address] [address] [place] [address]  [phone]    Call in 1 day        Final Impression:   1.  Cysts of both ovaries               (Please note that portions of this note were completed with a voice recognition program.  Efforts were made to edit the dictations but occasionally words are mis-transcribed.)      Myron Lara MD  09/24/22 [id number]

## 2022-09-28 RX ORDER — SERTRALINE HYDROCHLORIDE 25 MG/1
TABLET, FILM COATED ORAL
Qty: 30 TABLET | Refills: 0 | OUTPATIENT
Start: 2022-09-28

## 2022-09-28 RX ORDER — DICYCLOMINE HYDROCHLORIDE 10 MG/1
CAPSULE ORAL
Qty: 120 CAPSULE | Refills: 0 | OUTPATIENT
Start: 2022-09-28

## 2022-09-28 NOTE — TELEPHONE ENCOUNTER
Last OV: 8/30/2022 MAVIS  Last RX:    Next scheduled apt: Visit date not found          Medication  was D/C on 09-24-22 by Kaity Butler RN

## 2022-09-29 ENCOUNTER — OFFICE VISIT (OUTPATIENT)
Dept: OBGYN | Age: 22
End: 2022-09-29
Payer: COMMERCIAL

## 2022-09-29 VITALS
WEIGHT: 153 LBS | HEIGHT: 62 IN | BODY MASS INDEX: 28.16 KG/M2 | DIASTOLIC BLOOD PRESSURE: 68 MMHG | SYSTOLIC BLOOD PRESSURE: 110 MMHG

## 2022-09-29 DIAGNOSIS — N83.201 RIGHT OVARIAN CYST: Primary | ICD-10-CM

## 2022-09-29 PROCEDURE — G8427 DOCREV CUR MEDS BY ELIG CLIN: HCPCS | Performed by: OBSTETRICS & GYNECOLOGY

## 2022-09-29 PROCEDURE — G8419 CALC BMI OUT NRM PARAM NOF/U: HCPCS | Performed by: OBSTETRICS & GYNECOLOGY

## 2022-09-29 PROCEDURE — 1036F TOBACCO NON-USER: CPT | Performed by: OBSTETRICS & GYNECOLOGY

## 2022-09-29 PROCEDURE — 99202 OFFICE O/P NEW SF 15 MIN: CPT | Performed by: OBSTETRICS & GYNECOLOGY

## 2022-09-29 NOTE — PROGRESS NOTES
PROBLEM VISIT     Date of service: 2022    Alejandro Stephen  Is a 25 y.o. single female    PT's PCP is: ALEXIS Henderson - CNP     : 2000                                             Subjective:       Patient's last menstrual period was 2022 (approximate). OB History    Para Term  AB Living   0 0 0 0 0 0   SAB IAB Ectopic Molar Multiple Live Births   0 0 0 0 0 0        Social History     Tobacco Use   Smoking Status Never   Smokeless Tobacco Never        Social History     Substance and Sexual Activity   Alcohol Use Not Currently       Social History     Substance and Sexual Activity   Sexual Activity Yes    Partners: Male    Birth control/protection: Coitus interruptus, Pill       Allergies: Bee venom and Pcn [penicillins]    Chief Complaint   Patient presents with    New Patient     NP:Establish care. PT is here today to discuss cyst on right, and left ovary. Pt was seen in er 22 where ct scan of abdomen and pelvis was preformed and cysts were found. Last Yearly:  never    Last pap: never    Last HPV: never      NURSE: LMD    PE:  Vital Signs  Blood pressure 110/68, height 5' 2\" (1.575 m), weight 153 lb (69.4 kg), last menstrual period 2022. Labs:    No results found for this visit on 22. NURSE: Kierra Rodriguez    HPI: The patient is here today as a follow-up from the emergency room for right ovarian cyst.  She is still having some discomfort which improves with Motrin at this time. It is not near as severe as when she presented to the emergency room    No  PT denies fever, chills, nausea and vomiting       Objective: I did review findings of lab work and CT scan with the patient. Assessment and Plan: I did talk to the patient about continuing her oral contraceptives, continuing the nonsteroidal anti-inflammatories and observation.   Her other option would be surgical intervention which I did not recommend at this time.    Patient did not wish to have an exam today so we will set this up in the office in the future for Pap smear. Diagnosis Orders   1. Right ovarian cyst                  No follow-ups on file. FF: 15 minutes    There are no Patient Instructions on file for this visit. Over 75%of time spent on counseling and care coordination on: see assessment and plan,  She was also counseled on her preventative health maintenance recommendations and follow-up.       Chidi Carr MD,9/29/2022 2:33 PM

## 2022-10-18 NOTE — TELEPHONE ENCOUNTER
Last OV: 8/30/2022 MAVIS  Last RX:   Next scheduled apt: Visit date not found    Not on pt med list states it was discontinued  by Orestes Vargas RN on 09/24/2022    Is pt suppose to be taking medication?

## 2022-10-18 NOTE — TELEPHONE ENCOUNTER
Please call pt and find out if she is still taking this medication and if so, what dose. This is the second time I have gotten a refill request but the medication was \"discontinued\" by an ED nurse. So I need to know exactly what she is doing and if she needs more or not.

## 2022-10-19 ENCOUNTER — TELEPHONE (OUTPATIENT)
Dept: FAMILY MEDICINE CLINIC | Age: 22
End: 2022-10-19

## 2022-10-19 RX ORDER — SERTRALINE HYDROCHLORIDE 25 MG/1
TABLET, FILM COATED ORAL
Qty: 30 TABLET | Refills: 0 | Status: SHIPPED | OUTPATIENT
Start: 2022-10-19

## 2022-10-19 RX ORDER — SERTRALINE HYDROCHLORIDE 25 MG/1
25 TABLET, FILM COATED ORAL DAILY
COMMUNITY
End: 2022-11-03 | Stop reason: SDUPTHER

## 2022-10-19 NOTE — TELEPHONE ENCOUNTER
Patient needs new prior authorization done on her [facility] Maintenance   Topic Date Due    COVID-19 Vaccine (1) Never done    Varicella vaccine (1 of 2 - 2-dose childhood series) Never done    HPV vaccine (1 - 2-dose series) Never done    HIV screen  Never done    Hepatitis C screen  Never done    Chlamydia/GC screen  03/19/2021    Pap smear  Never done    Flu vaccine (1) Never done    Depression Monitoring  07/26/2023    DTaP/Tdap/Td vaccine (7 - Td or Tdap) 10/22/2023    Hib vaccine  Completed    Hepatitis A vaccine  Aged Out    Meningococcal (ACWY) vaccine  Aged Out    Pneumococcal 0-64 years Vaccine  Aged Out             (applicable per patient's age: Cancer Screenings, Depression Screening, Fall Risk Screening, Immunizations)    AST (U/L)   Date Value   09/24/2022 16     ALT (U/L)   Date Value   09/24/2022 20     BUN (mg/dL)   Date Value   09/24/2022 12      (goal A1C is < 7)   (goal LDL is <100) need 30-50% reduction from baseline     BP Readings from Last 3 Encounters:   09/29/22 110/68   09/24/22 111/65   08/30/22 100/60    (goal /80)      All Future Testing planned in [facility]:      Next Visit Date:  No future appointments.          Patient Active Problem List:     Dysmenorrhea     Moderate episode of recurrent major depressive disorder ([facility] [address]

## 2022-11-03 ENCOUNTER — OFFICE VISIT (OUTPATIENT)
Dept: FAMILY MEDICINE CLINIC | Age: 22
End: 2022-11-03
Payer: COMMERCIAL

## 2022-11-03 VITALS
DIASTOLIC BLOOD PRESSURE: 80 MMHG | WEIGHT: 154 LBS | HEART RATE: 80 BPM | BODY MASS INDEX: 28.17 KG/M2 | TEMPERATURE: 97.9 F | OXYGEN SATURATION: 99 % | SYSTOLIC BLOOD PRESSURE: 112 MMHG

## 2022-11-03 DIAGNOSIS — L70.0 PUSTULAR ACNE: Primary | ICD-10-CM

## 2022-11-03 PROCEDURE — G8427 DOCREV CUR MEDS BY ELIG CLIN: HCPCS | Performed by: NURSE PRACTITIONER

## 2022-11-03 PROCEDURE — 99213 OFFICE O/P EST LOW 20 MIN: CPT | Performed by: NURSE PRACTITIONER

## 2022-11-03 PROCEDURE — G8419 CALC BMI OUT NRM PARAM NOF/U: HCPCS | Performed by: NURSE PRACTITIONER

## 2022-11-03 PROCEDURE — G8484 FLU IMMUNIZE NO ADMIN: HCPCS | Performed by: NURSE PRACTITIONER

## 2022-11-03 PROCEDURE — 1036F TOBACCO NON-USER: CPT | Performed by: NURSE PRACTITIONER

## 2022-11-03 RX ORDER — CLINDAMYCIN PHOSPHATE AND BENZOYL PEROXIDE 10; 50 MG/G; MG/G
GEL TOPICAL
Qty: 45 G | Refills: 0 | Status: SHIPPED | OUTPATIENT
Start: 2022-11-03

## 2022-11-03 RX ORDER — CLINDAMYCIN AND BENZOYL PEROXIDE 10; 50 MG/G; MG/G
GEL TOPICAL 2 TIMES DAILY
Qty: 50 G | Refills: 0 | Status: SHIPPED | OUTPATIENT
Start: 2022-11-03

## 2022-11-03 ASSESSMENT — ENCOUNTER SYMPTOMS
DIARRHEA: 0
VOMITING: 0
SHORTNESS OF BREATH: 0
COUGH: 0
NAUSEA: 0

## 2022-11-03 NOTE — PROGRESS NOTES
HPI Notes    Name: Bethanie Allen  : 2000         Chief Complaint:     Chief Complaint   Patient presents with    Acne     Patient here today for acne of face, started about 2 months ago. History of Present Illness:        HPI  Pt is a 26 yo female who presents for complaints of acne. Pt has had acne since teen years but 2 months ago pt noticed an increase in acne. Has been getting large papules under her skin that don't come to the surface or pop. Has been trying OTC benzoyl peroxide but does not seem to help much. Past Medical History:     No past medical history on file. Reviewed all health maintenance requirements and ordered appropriate tests  Health Maintenance Due   Topic Date Due    COVID-19 Vaccine (1) Never done    Varicella vaccine (1 of 2 - 2-dose childhood series) Never done    HPV vaccine (1 - 2-dose series) Never done    HIV screen  Never done    Hepatitis C screen  Never done    Chlamydia/GC screen  2021    Pap smear  Never done    Flu vaccine (1) Never done       Past Surgical History:     Past Surgical History:   Procedure Laterality Date    HERNIA REPAIR      MYRINGOTOMY AND TYMPANOSTOMY TUBE PLACEMENT          Medications:       Prior to Admission medications    Medication Sig Start Date End Date Taking? Authorizing Provider   clindamycin-benzoyl peroxide (BENZACLIN) 1-5 % gel Apply topically 2 times daily Apply topically 2 times daily. 11/3/22  Yes ALEXIS Pina CNP   sertraline (ZOLOFT) 25 MG tablet take 1 tablet by mouth once daily 10/19/22  Yes ALEXIS Pina CNP   ELINEST 0.3-30 MG-MCG per tablet take 1 tablet by mouth once daily 22  Yes ALEXIS Pina CNP        Allergies:       Bee venom and Pcn [penicillins]    Social History:     Tobacco:    reports that she has never smoked. She has never used smokeless tobacco.  Alcohol:      reports that she does not currently use alcohol.   Drug Use:  reports no history of drug use.    Family History:     No family history on file. Review of Systems:         Review of Systems   Constitutional:  Negative for chills and fever. Respiratory:  Negative for cough and shortness of breath. Cardiovascular:  Negative for chest pain and palpitations. Gastrointestinal:  Negative for diarrhea, nausea and vomiting. Skin:  Positive for rash. Neurological:  Negative for dizziness, seizures and headaches. Physical Exam:     Vitals:  /80   Pulse 80   Temp 97.9 °F (36.6 °C) (Oral)   Wt 154 lb (69.9 kg)   SpO2 99%   BMI 28.17 kg/m²       Physical Exam  Vitals and nursing note reviewed. Constitutional:       Appearance: Normal appearance. She is well-developed. HENT:      Head:        Comments: Multiple small papules and pustules to bilat cheeks and chin. Underlying erythema  Cardiovascular:      Rate and Rhythm: Normal rate and regular rhythm. Heart sounds: Normal heart sounds, S1 normal and S2 normal.   Pulmonary:      Effort: Pulmonary effort is normal. No respiratory distress. Breath sounds: Normal breath sounds. Abdominal:      General: Bowel sounds are normal.      Palpations: Abdomen is soft. Tenderness: There is no abdominal tenderness. Skin:     General: Skin is warm and dry. Neurological:      Mental Status: She is alert and oriented to person, place, and time.              Data:     Lab Results   Component Value Date/Time     09/24/2022 10:03 AM    K 4.2 09/24/2022 10:03 AM     09/24/2022 10:03 AM    CO2 22 09/24/2022 10:03 AM    BUN 12 09/24/2022 10:03 AM    CREATININE 0.73 09/24/2022 10:03 AM    GLUCOSE 94 09/24/2022 10:03 AM    PROT 6.7 09/24/2022 10:03 AM    LABALBU 4.2 09/24/2022 10:03 AM    BILITOT 0.4 09/24/2022 10:03 AM    ALKPHOS 73 09/24/2022 10:03 AM    AST 16 09/24/2022 10:03 AM    ALT 20 09/24/2022 10:03 AM     Lab Results   Component Value Date/Time    WBC 10.9 09/24/2022 10:03 AM    RBC 4.80 09/24/2022 10:03 AM    HGB 14.5 09/24/2022 10:03 AM    HCT 41.9 09/24/2022 10:03 AM    MCV 87.3 09/24/2022 10:03 AM    MCH 30.1 09/24/2022 10:03 AM    MCHC 34.5 09/24/2022 10:03 AM    RDW 12.5 09/24/2022 10:03 AM     09/24/2022 10:03 AM    MPV NOT REPORTED 10/05/2021 09:47 AM     No results found for: TSH  No results found for: CHOL, LDL, HDL, PSA, LABA1C       Assessment & Plan        Diagnosis Orders   1. Pustular acne          Will start on clindamycin-benzoyl peroxide gel. Pt educated about medication. Patient verbalizes understanding and agreement with plan. All questions answered. If symptoms do not resolve or worsen, return to office. Completed Refills   Requested Prescriptions     Signed Prescriptions Disp Refills    clindamycin-benzoyl peroxide (BENZACLIN) 1-5 % gel 50 g 0     Sig: Apply topically 2 times daily Apply topically 2 times daily. Return in about 4 weeks (around 12/1/2022). Orders Placed This Encounter   Medications    clindamycin-benzoyl peroxide (BENZACLIN) 1-5 % gel     Sig: Apply topically 2 times daily Apply topically 2 times daily. Dispense:  50 g     Refill:  0     No orders of the defined types were placed in this encounter. Patient Instructions   SURVEY:    You may be receiving a survey from [employer] regarding your visit today. Please complete the survey to enable us to provide the highest quality of care to you and your family. If you cannot score us a very good (5 Stars) on any question, please call the office to discuss how we could have made your experience a very good one. Thank you.     Clinical Care Team: JOHN Botello LPN    Clerical Team: Svetlana Guadalupe   Electronically signed by ALEXIS Botello CNP on 11/3/2022 at 11:11 AM           Completed Refills   Requested Prescriptions     Signed Prescriptions Disp Refills    clindamycin-benzoyl peroxide (BENZACLIN) 1-5 % gel 50 g 0     Sig: Apply topically 2 times daily Apply topically 2 times daily.

## 2022-11-03 NOTE — TELEPHONE ENCOUNTER
Patient seen today for acne  Clindamycin -benzoil peroxide not on formulary  Neuac gel is on formulary which is the same type of medication  Rx pending

## 2022-11-03 NOTE — PATIENT INSTRUCTIONS
SURVEY:    You may be receiving a survey from [employer] regarding your visit today. Please complete the survey to enable us to provide the highest quality of care to you and your family. If you cannot score us a very good (5 Stars) on any question, please call the office to discuss how we could have made your experience a very good one. Thank you.     Clinical Care Team: Andrew Pemberton, ALEXIS-TRINITY Small LPN    Clerical Team: Jose Morse

## 2022-11-07 ENCOUNTER — HOSPITAL ENCOUNTER (OUTPATIENT)
Age: 22
Setting detail: SPECIMEN
Discharge: HOME | End: 2022-11-07
Payer: COMMERCIAL

## 2022-11-07 ENCOUNTER — OFFICE VISIT (OUTPATIENT)
Dept: PRIMARY CARE CLINIC | Age: 22
End: 2022-11-07
Payer: COMMERCIAL

## 2022-11-07 VITALS
BODY MASS INDEX: 28.43 KG/M2 | WEIGHT: 154.5 LBS | RESPIRATION RATE: 18 BRPM | DIASTOLIC BLOOD PRESSURE: 74 MMHG | TEMPERATURE: 98.5 F | OXYGEN SATURATION: 98 % | SYSTOLIC BLOOD PRESSURE: 111 MMHG | HEART RATE: 95 BPM | HEIGHT: 62 IN

## 2022-11-07 DIAGNOSIS — J02.9 VIRAL PHARYNGITIS: Primary | ICD-10-CM

## 2022-11-07 DIAGNOSIS — J02.9 SORE THROAT: ICD-10-CM

## 2022-11-07 PROBLEM — M20.10 ACQUIRED HALLUX VALGUS: Status: ACTIVE | Noted: 2022-11-07

## 2022-11-07 PROBLEM — M72.2 PLANTAR FASCIAL FIBROMATOSIS: Status: ACTIVE | Noted: 2022-11-07

## 2022-11-07 PROBLEM — S86.891A SHIN SPLINT OF RIGHT LOWER EXTREMITY: Status: ACTIVE | Noted: 2022-11-07

## 2022-11-07 PROBLEM — M79.609 PAIN IN LIMB: Status: ACTIVE | Noted: 2022-11-07

## 2022-11-07 LAB — S PYO AG THROAT QL: NORMAL

## 2022-11-07 PROCEDURE — 99213 OFFICE O/P EST LOW 20 MIN: CPT | Performed by: NURSE PRACTITIONER

## 2022-11-07 PROCEDURE — G8484 FLU IMMUNIZE NO ADMIN: HCPCS | Performed by: NURSE PRACTITIONER

## 2022-11-07 PROCEDURE — G8427 DOCREV CUR MEDS BY ELIG CLIN: HCPCS | Performed by: NURSE PRACTITIONER

## 2022-11-07 PROCEDURE — G8419 CALC BMI OUT NRM PARAM NOF/U: HCPCS | Performed by: NURSE PRACTITIONER

## 2022-11-07 PROCEDURE — 87880 STREP A ASSAY W/OPTIC: CPT | Performed by: NURSE PRACTITIONER

## 2022-11-07 PROCEDURE — 87651 STREP A DNA AMP PROBE: CPT

## 2022-11-07 PROCEDURE — 1036F TOBACCO NON-USER: CPT | Performed by: NURSE PRACTITIONER

## 2022-11-07 NOTE — PROGRESS NOTES
Chief Complaint:   Pharyngitis (Started 2 days ago-Sore throat and history of Strep. Fatigue.)      History of Present Illness   Source of history provided by:  patient. Keyana Thomson is a 25 y.o. old female who has a past medical history of: No past medical history on file. presents to the walk in clinic for evaluation of sore throat x 2 days. Reports associated pain with swallowing, nasal congestion, rhinorrhea, and body aches. Denies any fever, chills, dyspnea, dysphagia, CP, SOB, cough, nausea, vomiting, rash, or lethargy. ROS   Unless otherwise stated in this report or unable to obtain because of the patient's clinical or mental status as evidenced by the medical record, this patients's positive and negative responses for Review of Systems, constitutional, psych, eyes, ENT, cardiovascular, respiratory, gastrointestinal, neurological, genitourinary, musculoskeletal, integument systems and systems related to the presenting problem are either stated in the preceding or were not pertinent or were negative for the symptoms and/or complaints related to the medical problem. Past Medical History:  has no past medical history on file. Past Surgical History:  has a past surgical history that includes hernia repair and Myringotomy Tympanostomy Tube Placement. Social History:  reports that she has never smoked. She has never used smokeless tobacco. She reports that she does not currently use alcohol. She reports that she does not use drugs. Family History: family history is not on file. Allergies: Bee venom and Pcn [penicillins]    Physical Exam    VS:  /74 (Site: Left Upper Arm, Position: Sitting, Cuff Size: Medium Adult)   Pulse 95   Temp 98.5 °F (36.9 °C) (Oral)   Resp 18   Ht 5' 2\" (1.575 m)   Wt 154 lb 8 oz (70.1 kg)   LMP 10/25/2022 (Approximate)   SpO2 98%   BMI 28.26 kg/m²      Constitutional:  Alert, development consistent with age.   Ears:  TMs  without perforation, injection, or bulging. External canals clear without swelling or exudate. Throat: Airway patent. Posterior pharynx with minimal erythema and no tonsillar hypertrophy. No exudate noted. Neck:  Supple with full ROM. There is no palpable anterior bilateral adenopathy. Lungs:  Clear to auscultation and breath sounds equal.    CV: Regular rate and rhythm, normal heart sounds, without pathological murmurs, ectopy, gallops, or rubs. Skin:  No rashes, erythema present. Lymphatics: No lymphangitis or adenopathy noted other then stated above. Neurological:  Alert and orientated. Motor functions intact. Lab / Imaging Results   (All laboratory and radiology results have been personally reviewed by myself)  Labs:  No results found for this visit on 11/07/22. Imaging: All Radiology results interpreted by Radiologist unless otherwise noted. No orders to display       Medical Decision Making   Pt non-toxic, in no apparent distress and stable at time of discharge. Assessment / Plan   Impression(s):  Kathy was seen today for pharyngitis. Diagnoses and all orders for this visit:    Viral pharyngitis    Sore throat  -     POCT rapid strep A  -     Strep A DNA probe, amplification; Future      - Lida Handler is afebrile, well appearing with clear lung sounds. - POCT rapid strep reported as negative. Discussed viral pharyngitis and encouraged symptomatic treatment at home with rest, good oral hydration and over-the-counter acetaminophen and or ibuprofen as labeled as needed for pain. - Red flag symptoms discussed. ED immediately with fevers greater than 104, refractory fever, decreased oral intake,  lethargy, vomiting, dyspnea, neck stiffness, or stridor. Maame Kaitlynn, APRN - CNP    This visit was provided as a focused evaluation during the [place] -19 pandemic/national emergency. A comprehensive review of all previous patient history and testing was not conducted.   Pertinent findings were elicited during the visit.

## 2022-11-07 NOTE — LETTER
[address]  [facility]  Phone: [phone]  Fax: [address] [facility], [address], APRN - CNP        November 7, 2022     Patient: Yoko Vieira   YOB: 2000   Date of Visit: 11/7/2022       To Whom it May Concern:    Yoko Vieira was seen in my clinic on 11/7/2022. She may return to school on 11/08/2022. If you have any questions or concerns, please don't hesitate to call.     Sincerely,         Osman Gar, ALEXIS - CNP

## 2022-11-08 ENCOUNTER — TELEPHONE (OUTPATIENT)
Dept: FAMILY MEDICINE CLINIC | Age: 22
End: 2022-11-08

## 2022-11-08 LAB
DIRECT EXAM: NORMAL
SPECIMEN DESCRIPTION: NORMAL

## 2022-11-08 NOTE — TELEPHONE ENCOUNTER
Patient states she needs a prior auth for Clindamycin-Benzolyl. Patient uses [facility] in [facility]. Patient last seen  11/03/2022. Health Maintenance   Topic Date Due    COVID-19 Vaccine (1) Never done    Varicella vaccine (1 of 2 - 2-dose childhood series) Never done    HPV vaccine (1 - 2-dose series) Never done    HIV screen  Never done    Hepatitis C screen  Never done    Chlamydia/GC screen  03/19/2021    Pap smear  Never done    Flu vaccine (1) Never done    Depression Monitoring  07/26/2023    DTaP/Tdap/Td vaccine (7 - Td or Tdap) 10/22/2023    Hib vaccine  Completed    Hepatitis A vaccine  Aged Out    Meningococcal (ACWY) vaccine  Aged Out    Pneumococcal 0-64 years Vaccine  Aged Out             (applicable per patient's age: Cancer Screenings, Depression Screening, Fall Risk Screening, Immunizations)    AST (U/L)   Date Value   09/24/2022 16     ALT (U/L)   Date Value   09/24/2022 20     BUN (mg/dL)   Date Value   09/24/2022 12      (goal A1C is < 7)   (goal LDL is <100) need 30-50% reduction from baseline     BP Readings from Last 3 Encounters:   11/07/22 111/74   11/03/22 112/80   09/29/22 110/68    (goal /80)      All Future Testing planned in [facility]:      Next Visit Date:  No future appointments.          Patient Active Problem List:     Dysmenorrhea     Moderate episode of recurrent major depressive disorder (HCC)     Acquired hallux valgus     Pain in limb     Plantar fascial fibromatosis     Shin splint of right lower extremity

## 2022-11-14 RX ORDER — SERTRALINE HYDROCHLORIDE 25 MG/1
TABLET, FILM COATED ORAL
Qty: 30 TABLET | Refills: 5 | Status: SHIPPED | OUTPATIENT
Start: 2022-11-14

## 2022-12-02 ENCOUNTER — HOSPITAL ENCOUNTER (OUTPATIENT)
Age: 22
Discharge: HOME | End: 2022-12-02
Payer: COMMERCIAL

## 2022-12-02 ENCOUNTER — OFFICE VISIT (OUTPATIENT)
Dept: FAMILY MEDICINE CLINIC | Age: 22
End: 2022-12-02
Payer: COMMERCIAL

## 2022-12-02 VITALS
DIASTOLIC BLOOD PRESSURE: 80 MMHG | SYSTOLIC BLOOD PRESSURE: 112 MMHG | OXYGEN SATURATION: 97 % | WEIGHT: 154 LBS | HEART RATE: 88 BPM | TEMPERATURE: 98.4 F | BODY MASS INDEX: 28.17 KG/M2

## 2022-12-02 DIAGNOSIS — L70.0 PUSTULAR ACNE: ICD-10-CM

## 2022-12-02 DIAGNOSIS — F51.01 PRIMARY INSOMNIA: ICD-10-CM

## 2022-12-02 DIAGNOSIS — N94.6 DYSMENORRHEA: ICD-10-CM

## 2022-12-02 DIAGNOSIS — N94.6 DYSMENORRHEA: Primary | ICD-10-CM

## 2022-12-02 PROBLEM — M79.609 PAIN IN LIMB: Status: RESOLVED | Noted: 2022-11-07 | Resolved: 2022-12-02

## 2022-12-02 LAB
FOLLICLE STIMULATING HORMONE: 3 MIU/ML (ref 1.7–21.5)
LH: 7.5 MIU/ML (ref 1–95.6)
TESTOSTERONE TOTAL: 37 NG/DL (ref 20–70)
TSH SERPL DL<=0.05 MIU/L-ACNC: 2.23 UIU/ML (ref 0.3–5)

## 2022-12-02 PROCEDURE — 84403 ASSAY OF TOTAL TESTOSTERONE: CPT

## 2022-12-02 PROCEDURE — 84443 ASSAY THYROID STIM HORMONE: CPT

## 2022-12-02 PROCEDURE — 83001 ASSAY OF GONADOTROPIN (FSH): CPT

## 2022-12-02 PROCEDURE — G8427 DOCREV CUR MEDS BY ELIG CLIN: HCPCS | Performed by: NURSE PRACTITIONER

## 2022-12-02 PROCEDURE — 83002 ASSAY OF GONADOTROPIN (LH): CPT

## 2022-12-02 PROCEDURE — G8419 CALC BMI OUT NRM PARAM NOF/U: HCPCS | Performed by: NURSE PRACTITIONER

## 2022-12-02 PROCEDURE — G8484 FLU IMMUNIZE NO ADMIN: HCPCS | Performed by: NURSE PRACTITIONER

## 2022-12-02 PROCEDURE — 82671 ASSAY OF ESTROGENS: CPT

## 2022-12-02 PROCEDURE — 99213 OFFICE O/P EST LOW 20 MIN: CPT | Performed by: NURSE PRACTITIONER

## 2022-12-02 PROCEDURE — 1036F TOBACCO NON-USER: CPT | Performed by: NURSE PRACTITIONER

## 2022-12-02 PROCEDURE — 36415 COLL VENOUS BLD VENIPUNCTURE: CPT

## 2022-12-02 RX ORDER — CLINDAMYCIN AND BENZOYL PEROXIDE 10; 50 MG/G; MG/G
GEL TOPICAL 2 TIMES DAILY
Qty: 45 G | Refills: 2 | Status: SHIPPED | OUTPATIENT
Start: 2022-12-02

## 2022-12-02 ASSESSMENT — ENCOUNTER SYMPTOMS
NAUSEA: 0
SHORTNESS OF BREATH: 0
COUGH: 0
DIARRHEA: 0
VOMITING: 0

## 2022-12-02 NOTE — PROGRESS NOTES
HPI Notes    Name: Radha Sam  : 2000         Chief Complaint:     Chief Complaint   Patient presents with    Acne     Patient complains of acne, she wonders if it is hormonal.     Insomnia     Patient complains of insomnia x 3 weeks. Hormones? Dysmenorrhea     Patient complains of more painful menses. History of Present Illness:        HPI  Patient is a 71-year-old female who reports for complaints of continued dysmenorrhea. LMP 22. Heavier than usual, more pain and abd cramping then before. Patient has been taking OCP regularly. Pt is sexually active and monogamous. Complains of continued acne. Pt was prescribed clindamycin-benzoyl peroxide, but pt was not able to get the medication. Has been trouble falling asleep and staying asleep. Has tried melatonin 9mg which helps a little. Past Medical History:     No past medical history on file. Reviewed all health maintenance requirements and ordered appropriate tests  Health Maintenance Due   Topic Date Due    COVID-19 Vaccine (1) Never done    Varicella vaccine (1 of 2 - 2-dose childhood series) Never done    HPV vaccine (1 - 2-dose series) Never done    HIV screen  Never done    Hepatitis C screen  Never done    Chlamydia/GC screen  2021    Pap smear  Never done    Flu vaccine (1) Never done       Past Surgical History:     Past Surgical History:   Procedure Laterality Date    HERNIA REPAIR      MYRINGOTOMY AND TYMPANOSTOMY TUBE PLACEMENT          Medications:       Prior to Admission medications    Medication Sig Start Date End Date Taking? Authorizing Provider   clindamycin-benzoyl peroxide (BENZACLIN) 1-5 % gel Apply topically 2 times daily Apply topically 2 times daily.  22  Yes ALEXIS Hussein CNP   sertraline (ZOLOFT) 25 MG tablet take 1 tablet by mouth once daily 22  Yes ALEXIS Hussein CNP   ELINEST 0.3-30 MG-MCG per tablet take 1 tablet by mouth once daily 22  Yes Carlos Livingston Miles, APRN - CNP        Allergies:       Bee venom and Pcn [penicillins]    Social History:     Tobacco:    reports that she has never smoked. She has never used smokeless tobacco.  Alcohol:      reports that she does not currently use alcohol. Drug Use:  reports no history of drug use. Family History:     No family history on file. Review of Systems:         Review of Systems   Constitutional:  Negative for chills and fever. Respiratory:  Negative for cough and shortness of breath. Cardiovascular:  Negative for chest pain and palpitations. Gastrointestinal:  Negative for diarrhea, nausea and vomiting. Skin:  Positive for rash (acne). Neurological:  Negative for dizziness, seizures and headaches. Physical Exam:     Vitals:  /80   Pulse 88   Temp 98.4 °F (36.9 °C) (Oral)   Wt 154 lb (69.9 kg)   SpO2 97%   BMI 28.17 kg/m²       Physical Exam  Vitals and nursing note reviewed. Constitutional:       Appearance: Normal appearance. She is well-developed. Cardiovascular:      Rate and Rhythm: Normal rate and regular rhythm. Heart sounds: Normal heart sounds, S1 normal and S2 normal.   Pulmonary:      Effort: Pulmonary effort is normal. No respiratory distress. Breath sounds: Normal breath sounds. Abdominal:      General: Bowel sounds are normal.      Palpations: Abdomen is soft. Tenderness: There is no abdominal tenderness. Skin:     General: Skin is warm and dry. Findings: Acne and rash present. Rash is pustular. Comments: Cystic and pustular acne to bilat cheeks   Neurological:      Mental Status: She is alert and oriented to person, place, and time.              Data:     Lab Results   Component Value Date/Time     09/24/2022 10:03 AM    K 4.2 09/24/2022 10:03 AM     09/24/2022 10:03 AM    CO2 22 09/24/2022 10:03 AM    BUN 12 09/24/2022 10:03 AM    CREATININE 0.73 09/24/2022 10:03 AM    GLUCOSE 94 09/24/2022 10:03 AM    PROT 6.7 09/24/2022 10:03 AM    LABALBU 4.2 09/24/2022 10:03 AM    BILITOT 0.4 09/24/2022 10:03 AM    ALKPHOS 73 09/24/2022 10:03 AM    AST 16 09/24/2022 10:03 AM    ALT 20 09/24/2022 10:03 AM     Lab Results   Component Value Date/Time    WBC 10.9 09/24/2022 10:03 AM    RBC 4.80 09/24/2022 10:03 AM    HGB 14.5 09/24/2022 10:03 AM    HCT 41.9 09/24/2022 10:03 AM    MCV 87.3 09/24/2022 10:03 AM    MCH 30.1 09/24/2022 10:03 AM    MCHC 34.5 09/24/2022 10:03 AM    RDW 12.5 09/24/2022 10:03 AM     09/24/2022 10:03 AM    MPV NOT REPORTED 10/05/2021 09:47 AM     No results found for: TSH  No results found for: CHOL, LDL, HDL, PSA, LABA1C       Assessment & Plan        Diagnosis Orders   1. Dysmenorrhea   --will send for some labs. Continue OCP. Follicle Stimulating Hormone    Luteinizing Hormone    TSH With Reflex Ft4    Estrogens, Fractionated    Testosterone      2. Primary insomnia   --change zoloft to AM doing. May continue to take melatonin       3. Pustular acne   --will write for clindamycin-benzoyl peroxide and pt will pay cash. Given GoodRx coupon         Patient verbalizes understanding and agreement with plan. All questions answered. If symptoms do not resolve or worsen, return to office. Completed Refills   Requested Prescriptions     Signed Prescriptions Disp Refills    clindamycin-benzoyl peroxide (BENZACLIN) 1-5 % gel 45 g 2     Sig: Apply topically 2 times daily Apply topically 2 times daily. No follow-ups on file. Orders Placed This Encounter   Medications    clindamycin-benzoyl peroxide (BENZACLIN) 1-5 % gel     Sig: Apply topically 2 times daily Apply topically 2 times daily.      Dispense:  45 g     Refill:  2     Orders Placed This Encounter   Procedures    Follicle Stimulating Hormone     Standing Status:   Future     Number of Occurrences:   1     Standing Expiration Date:   12/2/2023    Luteinizing Hormone     Standing Status:   Future     Number of Occurrences:   1     Standing Expiration Date:   12/2/2023    TSH With Reflex Ft4     Standing Status:   Future     Number of Occurrences:   1     Standing Expiration Date:   12/2/2023    Estrogens, Fractionated     Standing Status:   Future     Number of Occurrences:   1     Standing Expiration Date:   12/2/2023    Testosterone     Standing Status:   Future     Number of Occurrences:   1     Standing Expiration Date:   12/2/2023         Patient Instructions   SURVEY:    You may be receiving a survey from [employer] regarding your visit today. Please complete the survey to enable us to provide the highest quality of care to you and your family. If you cannot score us a very good (5 Stars) on any question, please call the office to discuss how we could have made your experience a very good one. Thank you. Clinical Care Team: JOHN Thacker LPN    Clerical Team: Peng Nelson   Electronically signed by ALEXIS Thacker CNP on 12/2/2022 at 10:49 AM           Completed Refills   Requested Prescriptions     Signed Prescriptions Disp Refills    clindamycin-benzoyl peroxide (BENZACLIN) 1-5 % gel 45 g 2     Sig: Apply topically 2 times daily Apply topically 2 times daily.

## 2022-12-02 NOTE — PATIENT INSTRUCTIONS
SURVEY:    You may be receiving a survey from [employer] regarding your visit today. Please complete the survey to enable us to provide the highest quality of care to you and your family. If you cannot score us a very good (5 Stars) on any question, please call the office to discuss how we could have made your experience a very good one. Thank you.     Clinical Care Team: ALEXIS Hill-TRINITY Russell LPN    Clerical Team: Jose Cordero

## 2022-12-28 NOTE — TELEPHONE ENCOUNTER
Last OV: 12/2/2022 08/30/22 chronic   Last RX:    Next scheduled apt: 12/30/2022          Pt requesting a refill

## 2022-12-29 RX ORDER — CLINDAMYCIN AND BENZOYL PEROXIDE 10; 50 MG/G; MG/G
GEL TOPICAL 2 TIMES DAILY
Qty: 45 G | Refills: 2 | Status: SHIPPED | OUTPATIENT
Start: 2022-12-29

## 2022-12-30 ENCOUNTER — OFFICE VISIT (OUTPATIENT)
Dept: FAMILY MEDICINE CLINIC | Age: 22
End: 2022-12-30
Payer: COMMERCIAL

## 2022-12-30 VITALS
DIASTOLIC BLOOD PRESSURE: 80 MMHG | HEART RATE: 84 BPM | TEMPERATURE: 98.2 F | OXYGEN SATURATION: 97 % | SYSTOLIC BLOOD PRESSURE: 120 MMHG

## 2022-12-30 DIAGNOSIS — R11.0 NAUSEA IN ADULT: Primary | ICD-10-CM

## 2022-12-30 DIAGNOSIS — F41.1 GAD (GENERALIZED ANXIETY DISORDER): ICD-10-CM

## 2022-12-30 PROCEDURE — G8484 FLU IMMUNIZE NO ADMIN: HCPCS | Performed by: NURSE PRACTITIONER

## 2022-12-30 PROCEDURE — 99213 OFFICE O/P EST LOW 20 MIN: CPT | Performed by: NURSE PRACTITIONER

## 2022-12-30 PROCEDURE — G8427 DOCREV CUR MEDS BY ELIG CLIN: HCPCS | Performed by: NURSE PRACTITIONER

## 2022-12-30 PROCEDURE — G8419 CALC BMI OUT NRM PARAM NOF/U: HCPCS | Performed by: NURSE PRACTITIONER

## 2022-12-30 PROCEDURE — 1036F TOBACCO NON-USER: CPT | Performed by: NURSE PRACTITIONER

## 2022-12-30 RX ORDER — ONDANSETRON 4 MG/1
4 TABLET, ORALLY DISINTEGRATING ORAL 3 TIMES DAILY PRN
Qty: 30 TABLET | Refills: 0 | Status: SHIPPED | OUTPATIENT
Start: 2022-12-30

## 2022-12-30 RX ORDER — DICYCLOMINE HYDROCHLORIDE 10 MG/1
10 CAPSULE ORAL
COMMUNITY

## 2022-12-30 ASSESSMENT — ENCOUNTER SYMPTOMS
ABDOMINAL PAIN: 0
VOMITING: 0
COUGH: 0
NAUSEA: 1
SHORTNESS OF BREATH: 0
DIARRHEA: 0

## 2022-12-30 NOTE — PROGRESS NOTES
HPI Notes    Name: Khadra Rodriguez  : 2000         Chief Complaint:     Chief Complaint   Patient presents with    Nausea & Vomiting     Patient here today with nausea x 2 weeks. History of Present Illness:        HPI  Pt is a 26 yo female who presents for intermittent nausea. Started a couple weeks ago when her brother was sick with the \"stomach flu\". Felt fatigue and \"bad\" for a few days. Took a friend's zofran and this helped a lot. Did make her somewhat sleepy. Past Medical History:     No past medical history on file. Reviewed all health maintenance requirements and ordered appropriate tests  Health Maintenance Due   Topic Date Due    COVID-19 Vaccine (1) Never done    Varicella vaccine (1 of 2 - 2-dose childhood series) Never done    HPV vaccine (1 - 2-dose series) Never done    HIV screen  Never done    Hepatitis C screen  Never done    Chlamydia/GC screen  2021    Pap smear  Never done    Flu vaccine (1) Never done       Past Surgical History:     Past Surgical History:   Procedure Laterality Date    HERNIA REPAIR      MYRINGOTOMY AND TYMPANOSTOMY TUBE PLACEMENT          Medications:       Prior to Admission medications    Medication Sig Start Date End Date Taking? Authorizing Provider   ondansetron (ZOFRAN-ODT) 4 MG disintegrating tablet Take 1 tablet by mouth 3 times daily as needed for Nausea or Vomiting 22  Yes ALEXIS Blanca CNP   dicyclomine (BENTYL) 10 MG capsule Take 10 mg by mouth 4 times daily (before meals and nightly)   Yes Historical Provider, MD   clindamycin-benzoyl peroxide (BENZACLIN) 1-5 % gel Apply topically 2 times daily Apply topically 2 times daily.  22  Yes ALEXIS Blanca CNP   sertraline (ZOLOFT) 25 MG tablet take 1 tablet by mouth once daily 22  Yes ALEXIS Blanca CNP   ELINEST 0.3-30 MG-MCG per tablet take 1 tablet by mouth once daily 22  Yes ALEXIS Blanca CNP        Allergies:       Irasema Lua venom and Pcn [penicillins]    Social History:     Tobacco:    reports that she has never smoked. She has never used smokeless tobacco.  Alcohol:      reports that she does not currently use alcohol. Drug Use:  reports no history of drug use. Family History:     No family history on file. Review of Systems:         Review of Systems   Constitutional:  Negative for chills and fever. Respiratory:  Negative for cough and shortness of breath. Cardiovascular:  Negative for chest pain and palpitations. Gastrointestinal:  Positive for nausea. Negative for abdominal pain, diarrhea and vomiting. Neurological:  Negative for dizziness, seizures and headaches. Physical Exam:     Vitals:  /80   Pulse 84   Temp 98.2 °F (36.8 °C) (Oral)   SpO2 97%       Physical Exam  Vitals and nursing note reviewed. Constitutional:       Appearance: Normal appearance. She is well-developed. Cardiovascular:      Rate and Rhythm: Regular rhythm. Heart sounds: Normal heart sounds, S1 normal and S2 normal.   Pulmonary:      Effort: Pulmonary effort is normal. No respiratory distress. Breath sounds: Normal breath sounds. Abdominal:      General: Bowel sounds are normal.      Palpations: Abdomen is soft. Tenderness: There is no abdominal tenderness. Skin:     General: Skin is warm and dry. Neurological:      Mental Status: She is alert and oriented to person, place, and time.              Data:     Lab Results   Component Value Date/Time     09/24/2022 10:03 AM    K 4.2 09/24/2022 10:03 AM     09/24/2022 10:03 AM    CO2 22 09/24/2022 10:03 AM    BUN 12 09/24/2022 10:03 AM    CREATININE 0.73 09/24/2022 10:03 AM    GLUCOSE 94 09/24/2022 10:03 AM    PROT 6.7 09/24/2022 10:03 AM    LABALBU 4.2 09/24/2022 10:03 AM    BILITOT 0.4 09/24/2022 10:03 AM    ALKPHOS 73 09/24/2022 10:03 AM    AST 16 09/24/2022 10:03 AM    ALT 20 09/24/2022 10:03 AM     Lab Results   Component Value Date/Time    WBC 10.9 09/24/2022 10:03 AM    RBC 4.80 09/24/2022 10:03 AM    HGB 14.5 09/24/2022 10:03 AM    HCT 41.9 09/24/2022 10:03 AM    MCV 87.3 09/24/2022 10:03 AM    MCH 30.1 09/24/2022 10:03 AM    MCHC 34.5 09/24/2022 10:03 AM    RDW 12.5 09/24/2022 10:03 AM     09/24/2022 10:03 AM    MPV NOT REPORTED 10/05/2021 09:47 AM     Lab Results   Component Value Date/Time    TSH 2.23 12/02/2022 10:18 AM     No results found for: CHOL, LDL, HDL, PSA, LABA1C       Assessment & Plan        Diagnosis Orders   1. Nausea in adult        2. MAVIS (generalized anxiety disorder)          I do believe that nausea is secondary to anxiety. Patient is going to follow with counseling once college resumes. We will give patient some Zofran as needed. Patient educated about medication. May need to increase zoloft if anxiety is not controlled better with counseling. Completed Refills   Requested Prescriptions     Signed Prescriptions Disp Refills    ondansetron (ZOFRAN-ODT) 4 MG disintegrating tablet 30 tablet 0     Sig: Take 1 tablet by mouth 3 times daily as needed for Nausea or Vomiting     No follow-ups on file. Orders Placed This Encounter   Medications    ondansetron (ZOFRAN-ODT) 4 MG disintegrating tablet     Sig: Take 1 tablet by mouth 3 times daily as needed for Nausea or Vomiting     Dispense:  30 tablet     Refill:  0     No orders of the defined types were placed in this encounter. Patient Instructions   SURVEY:    You may be receiving a survey from [employer] regarding your visit today. Please complete the survey to enable us to provide the highest quality of care to you and your family. If you cannot score us a very good (5 Stars) on any question, please call the office to discuss how we could have made your experience a very good one. Thank you.     Clinical Care Team: Maryla Leyden, APRN-[facility] Acevedo LPN    Clerical Team: Ced Carver 23 Swetha Booker   Electronically signed by ALEXIS Loredo CNP on 12/30/2022 at 1:04 PM           Completed Refills   Requested Prescriptions     Signed Prescriptions Disp Refills    ondansetron (ZOFRAN-ODT) 4 MG disintegrating tablet 30 tablet 0     Sig: Take 1 tablet by mouth 3 times daily as needed for Nausea or Vomiting

## 2022-12-30 NOTE — PATIENT INSTRUCTIONS
SURVEY:    You may be receiving a survey from [employer] regarding your visit today. Please complete the survey to enable us to provide the highest quality of care to you and your family. If you cannot score us a very good (5 Stars) on any question, please call the office to discuss how we could have made your experience a very good one. Thank you.     Clinical Care Team: ALEXIS Temple-[facility] Noriega LPN    Clerical Team: Jose Zaragoza

## 2023-01-06 ENCOUNTER — TELEPHONE (OUTPATIENT)
Dept: FAMILY MEDICINE CLINIC | Age: 23
End: 2023-01-06

## 2023-01-06 NOTE — TELEPHONE ENCOUNTER
Ok, let's have her hold bentyl x 2 days as this can cause vomiting. Hold zoloft x 2 days as this can cause diarrhea. Don't worry, anxiety won't get out of control because it has a long half life. See me on Monday. If not better with holding meds, will send to GI. Maintain good hydration.

## 2023-01-06 NOTE — TELEPHONE ENCOUNTER
----- Message from Luisa Simon sent at 1/6/2023  8:07 AM EST -----  Subject: Message to Provider    QUESTIONS  Information for Provider? Pt saw Kevon Kelsey on 12/30 for nausea. She states that   she has seen no improvement even with the medication. She states that she   has not had an appetite in 3 days either and she is having trouble   sleeping. Please advise.   ---------------------------------------------------------------------------  --------------  Heather PARISI  [phone]; OK to leave message on voicemail  ---------------------------------------------------------------------------  --------------  SCRIPT ANSWERS  Relationship to Patient? Self  Do you have pain that has started or worsened within the past 24 hours? No  Are you vomiting blood or have bloody or black stool? No  Have you recently (14 days) seen a provider for this pain?  Yes

## 2023-01-06 NOTE — TELEPHONE ENCOUNTER
Spoke with pt she is having a new symptom of very dark lose stool x two days. The last three days she has had no appetite and feeling nausea all day long. Pt has ate about 7 crackers and a soup in the last 2 days during the night she has been waking up feeling like she need to vomit.  Please advise

## 2023-01-09 ENCOUNTER — OFFICE VISIT (OUTPATIENT)
Dept: FAMILY MEDICINE CLINIC | Age: 23
End: 2023-01-09
Payer: COMMERCIAL

## 2023-01-09 ENCOUNTER — TELEPHONE (OUTPATIENT)
Dept: FAMILY MEDICINE CLINIC | Age: 23
End: 2023-01-09

## 2023-01-09 VITALS
SYSTOLIC BLOOD PRESSURE: 110 MMHG | DIASTOLIC BLOOD PRESSURE: 70 MMHG | OXYGEN SATURATION: 98 % | WEIGHT: 149 LBS | BODY MASS INDEX: 27.25 KG/M2 | TEMPERATURE: 97.8 F | HEART RATE: 86 BPM

## 2023-01-09 DIAGNOSIS — R11.0 NAUSEA IN ADULT: Primary | ICD-10-CM

## 2023-01-09 DIAGNOSIS — F41.1 GAD (GENERALIZED ANXIETY DISORDER): ICD-10-CM

## 2023-01-09 PROCEDURE — 99213 OFFICE O/P EST LOW 20 MIN: CPT | Performed by: NURSE PRACTITIONER

## 2023-01-09 RX ORDER — BUSPIRONE HYDROCHLORIDE 5 MG/1
5 TABLET ORAL 3 TIMES DAILY PRN
Qty: 90 TABLET | Refills: 0 | Status: SHIPPED | OUTPATIENT
Start: 2023-01-09 | End: 2023-02-08

## 2023-01-09 ASSESSMENT — PATIENT HEALTH QUESTIONNAIRE - PHQ9
SUM OF ALL RESPONSES TO PHQ QUESTIONS 1-9: 1
6. FEELING BAD ABOUT YOURSELF - OR THAT YOU ARE A FAILURE OR HAVE LET YOURSELF OR YOUR FAMILY DOWN: 0
3. TROUBLE FALLING OR STAYING ASLEEP: 0
9. THOUGHTS THAT YOU WOULD BE BETTER OFF DEAD, OR OF HURTING YOURSELF: 0
SUM OF ALL RESPONSES TO PHQ9 QUESTIONS 1 & 2: 0
SUM OF ALL RESPONSES TO PHQ QUESTIONS 1-9: 1
4. FEELING TIRED OR HAVING LITTLE ENERGY: 0
7. TROUBLE CONCENTRATING ON THINGS, SUCH AS READING THE NEWSPAPER OR WATCHING TELEVISION: 0
SUM OF ALL RESPONSES TO PHQ QUESTIONS 1-9: 1
SUM OF ALL RESPONSES TO PHQ QUESTIONS 1-9: 1
8. MOVING OR SPEAKING SO SLOWLY THAT OTHER PEOPLE COULD HAVE NOTICED. OR THE OPPOSITE, BEING SO FIGETY OR RESTLESS THAT YOU HAVE BEEN MOVING AROUND A LOT MORE THAN USUAL: 0
2. FEELING DOWN, DEPRESSED OR HOPELESS: 0
10. IF YOU CHECKED OFF ANY PROBLEMS, HOW DIFFICULT HAVE THESE PROBLEMS MADE IT FOR YOU TO DO YOUR WORK, TAKE CARE OF THINGS AT HOME, OR GET ALONG WITH OTHER PEOPLE: 0
1. LITTLE INTEREST OR PLEASURE IN DOING THINGS: 0
5. POOR APPETITE OR OVEREATING: 1

## 2023-01-09 ASSESSMENT — ENCOUNTER SYMPTOMS: SHORTNESS OF BREATH: 0

## 2023-01-09 NOTE — TELEPHONE ENCOUNTER
Patient is calling with a concern on taking sertraline and buspirone together. She had read somewhere not to take together. Please verify and let patient know. Patient was seen today in the office. Health Maintenance   Topic Date Due    COVID-19 Vaccine (1) Never done    Varicella vaccine (1 of 2 - 2-dose childhood series) Never done    HPV vaccine (1 - 2-dose series) Never done    HIV screen  Never done    Hepatitis C screen  Never done    Chlamydia/GC screen  03/19/2021    Pap smear  Never done    Flu vaccine (1) Never done    Depression Monitoring  07/26/2023    DTaP/Tdap/Td vaccine (7 - Td or Tdap) 10/22/2023    Hib vaccine  Completed    Hepatitis A vaccine  Aged Out    Meningococcal (ACWY) vaccine  Aged Out    Pneumococcal 0-64 years Vaccine  Aged Out             (applicable per patient's age: Cancer Screenings, Depression Screening, Fall Risk Screening, Immunizations)    AST (U/L)   Date Value   09/24/2022 16     ALT (U/L)   Date Value   09/24/2022 20     BUN (mg/dL)   Date Value   09/24/2022 12      (goal A1C is < 7)   (goal LDL is <100) need 30-50% reduction from baseline     BP Readings from Last 3 Encounters:   01/09/23 110/70   12/30/22 120/80   12/02/22 112/80    (goal /80)      All Future Testing planned in [facility]:      Next Visit Date:  No future appointments.          Patient Active Problem List:     Dysmenorrhea     Moderate episode of recurrent major depressive disorder (HCC)     Acquired hallux valgus     Plantar fascial fibromatosis     Shin splint of right lower extremity

## 2023-01-09 NOTE — TELEPHONE ENCOUNTER
They are fine to take together as they work in two different ways, especially since she will only be taking the BuSpar as needed.

## 2023-01-09 NOTE — PROGRESS NOTES
HPI Notes    Name: Maurice Hollingsworth  : 2000         Chief Complaint:     Chief Complaint   Patient presents with    Nausea & Vomiting     Patient here today for nausea, poor appetite. No emesis . History of Present Illness:        HPI  Pt is a 26 yo female who presents for nausea and poor appetite. First noticed around the start of the new year. Nausea starts in the AM and nausea comes in waves throughout the day. Seems to be more intense at night. Pt wonders if this could be due to anxiety. Pt has struggled with anxiety for several years. Admits to feeling more anxious at night. Also has felt some body aches and a rapid heart rate while feeling nausea. Has not been eating much food, bland things. Pt was told to hold bentyl and zoloft x 2 days and see if symptoms improve. Symptoms have improved somewhat. Past Medical History:     No past medical history on file. Reviewed all health maintenance requirements and ordered appropriate tests  Health Maintenance Due   Topic Date Due    COVID-19 Vaccine (1) Never done    Varicella vaccine (1 of 2 - 2-dose childhood series) Never done    HPV vaccine (1 - 2-dose series) Never done    HIV screen  Never done    Hepatitis C screen  Never done    Chlamydia/GC screen  2021    Pap smear  Never done    Flu vaccine (1) Never done       Past Surgical History:     Past Surgical History:   Procedure Laterality Date    HERNIA REPAIR      MYRINGOTOMY AND TYMPANOSTOMY TUBE PLACEMENT          Medications:       Prior to Admission medications    Medication Sig Start Date End Date Taking? Authorizing Provider   busPIRone (BUSPAR) 5 MG tablet Take 1 tablet by mouth 3 times daily as needed (anxiety) 23 Yes Ender Mcclelland APRN - [facility]   clindamycin-benzoyl peroxide (BENZACLIN) 1-5 % gel Apply topically 2 times daily Apply topically 2 times daily.  22  Yes ALEXIS Proctor CNP   ELINEST 0.3-30 MG-MCG per tablet take 1 tablet by mouth once daily 7/20/22  Yes ALEXIS Patel CNP   ondansetron (ZOFRAN-ODT) 4 MG disintegrating tablet Take 1 tablet by mouth 3 times daily as needed for Nausea or Vomiting  Patient not taking: Reported on 1/9/2023 12/30/22   ALEXIS Patel CNP   dicyclomine (BENTYL) 10 MG capsule Take 10 mg by mouth 4 times daily (before meals and nightly)  Patient not taking: Reported on 1/9/2023    Historical Provider, MD   sertraline (ZOLOFT) 25 MG tablet take 1 tablet by mouth once daily  Patient not taking: Reported on 1/9/2023 11/14/22   ALEXIS Patel CNP        Allergies:       Bee venom and Pcn [penicillins]    Social History:     Tobacco:    reports that she has never smoked. She has never used smokeless tobacco.  Alcohol:      reports that she does not currently use alcohol. Drug Use:  reports no history of drug use. Family History:     No family history on file. Review of Systems:         Review of Systems   Respiratory:  Negative for shortness of breath. Physical Exam:     Vitals:  /70   Pulse 86   Temp 97.8 °F (36.6 °C) (Oral)   Wt 149 lb (67.6 kg)   SpO2 98%   BMI 27.25 kg/m²       Physical Exam  Vitals and nursing note reviewed. Constitutional:       Appearance: Normal appearance. She is well-developed. Cardiovascular:      Rate and Rhythm: Regular rhythm. Heart sounds: Normal heart sounds, S1 normal and S2 normal.   Pulmonary:      Effort: Pulmonary effort is normal. No respiratory distress. Breath sounds: Normal breath sounds. Abdominal:      General: Bowel sounds are normal.      Palpations: Abdomen is soft. Tenderness: There is no abdominal tenderness. Skin:     General: Skin is warm and dry. Neurological:      Mental Status: She is alert and oriented to person, place, and time.              Data:     Lab Results   Component Value Date/Time     09/24/2022 10:03 AM    K 4.2 09/24/2022 10:03 AM     09/24/2022 10:03 AM    CO2 22 09/24/2022 10:03 AM    BUN 12 09/24/2022 10:03 AM    CREATININE 0.73 09/24/2022 10:03 AM    GLUCOSE 94 09/24/2022 10:03 AM    PROT 6.7 09/24/2022 10:03 AM    LABALBU 4.2 09/24/2022 10:03 AM    BILITOT 0.4 09/24/2022 10:03 AM    ALKPHOS 73 09/24/2022 10:03 AM    AST 16 09/24/2022 10:03 AM    ALT 20 09/24/2022 10:03 AM     Lab Results   Component Value Date/Time    WBC 10.9 09/24/2022 10:03 AM    RBC 4.80 09/24/2022 10:03 AM    HGB 14.5 09/24/2022 10:03 AM    HCT 41.9 09/24/2022 10:03 AM    MCV 87.3 09/24/2022 10:03 AM    MCH 30.1 09/24/2022 10:03 AM    MCHC 34.5 09/24/2022 10:03 AM    RDW 12.5 09/24/2022 10:03 AM     09/24/2022 10:03 AM    MPV NOT REPORTED 10/05/2021 09:47 AM     Lab Results   Component Value Date/Time    TSH 2.23 12/02/2022 10:18 AM     No results found for: CHOL, LDL, HDL, PSA, LABA1C       Assessment & Plan        Diagnosis Orders   1. Nausea in adult        2. MAVIS (generalized anxiety disorder)          I believe this problem could be related to her anxiety. Patient will resume taking Zoloft and in fact increase the dose to 50 mg p.o. daily. We will also add BuSpar 5 mg p.o. 3 times daily as needed anxiety. Patient educated about medication. If no improvement, will send to GI. Completed Refills   Requested Prescriptions     Signed Prescriptions Disp Refills    busPIRone (BUSPAR) 5 MG tablet 90 tablet 0     Sig: Take 1 tablet by mouth 3 times daily as needed (anxiety)     No follow-ups on file. Orders Placed This Encounter   Medications    busPIRone (BUSPAR) 5 MG tablet     Sig: Take 1 tablet by mouth 3 times daily as needed (anxiety)     Dispense:  90 tablet     Refill:  0     No orders of the defined types were placed in this encounter. Patient Instructions   SURVEY:    You may be receiving a survey from [employer] regarding your visit today. Please complete the survey to enable us to provide the highest quality of care to you and your family.     If you cannot score us a very good (5 Stars) on any question, please call the office to discuss how we could have made your experience a very good one. Thank you.     Clinical Care Team: JOHN Delacruz LPN    Clerical Team: Peng Nelson   Electronically signed by ALEXIS Delacruz CNP on 1/9/2023 at 9:10 AM           Completed Refills   Requested Prescriptions     Signed Prescriptions Disp Refills    busPIRone (BUSPAR) 5 MG tablet 90 tablet 0     Sig: Take 1 tablet by mouth 3 times daily as needed (anxiety)

## 2023-01-09 NOTE — PATIENT INSTRUCTIONS
SURVEY:    You may be receiving a survey from [employer] regarding your visit today. Please complete the survey to enable us to provide the highest quality of care to you and your family. If you cannot score us a very good (5 Stars) on any question, please call the office to discuss how we could have made your experience a very good one. Thank you.     Clinical Care Team: ALEXIS Nance-[facility] Junior LPN    Clerical Team: Jose Kelley

## 2023-01-10 ENCOUNTER — PATIENT MESSAGE (OUTPATIENT)
Dept: FAMILY MEDICINE CLINIC | Age: 23
End: 2023-01-10

## 2023-01-11 RX ORDER — ONDANSETRON 4 MG/1
4 TABLET, ORALLY DISINTEGRATING ORAL 3 TIMES DAILY PRN
Qty: 30 TABLET | Refills: 0 | OUTPATIENT
Start: 2023-01-11

## 2023-01-11 NOTE — TELEPHONE ENCOUNTER
From: Mckenna Locke  To: Kelton Resendez  Sent: 1/10/2023 7:32 PM EST  Subject: Anxiety    Hi Ezekiel! I wanted to reach out to you in regards to my anxiety. I think the medication you put me on does help! Merlykodi noticed I feel anxious less often. However, I think this anxiety is causing me to develop somewhat of an eating disorder, especially at night. Every night before dinner my appetite shuts down. I can feel my stomach growling that its hungry, but when I have the food in front of my face its hard to eat it. I had to force myself to eat tonight and cried the entire time. Im not sure if you have any recommendations for this. I do start counseling next week, so hopefully that can give me more answers as well. Thanks!   Mckenna Locke

## 2023-01-12 RX ORDER — ONDANSETRON 4 MG/1
4 TABLET, ORALLY DISINTEGRATING ORAL 3 TIMES DAILY PRN
Qty: 30 TABLET | Refills: 0 | Status: CANCELLED | OUTPATIENT
Start: 2023-01-12

## 2023-01-12 RX ORDER — ONDANSETRON 4 MG/1
4 TABLET, ORALLY DISINTEGRATING ORAL 3 TIMES DAILY PRN
Qty: 30 TABLET | Refills: 0 | Status: SHIPPED | OUTPATIENT
Start: 2023-01-12

## 2023-01-19 ENCOUNTER — OFFICE VISIT (OUTPATIENT)
Dept: FAMILY MEDICINE CLINIC | Age: 23
End: 2023-01-19
Payer: COMMERCIAL

## 2023-01-19 ENCOUNTER — PATIENT MESSAGE (OUTPATIENT)
Dept: FAMILY MEDICINE CLINIC | Age: 23
End: 2023-01-19

## 2023-01-19 VITALS
HEART RATE: 96 BPM | WEIGHT: 147 LBS | DIASTOLIC BLOOD PRESSURE: 70 MMHG | OXYGEN SATURATION: 98 % | HEIGHT: 62 IN | BODY MASS INDEX: 27.05 KG/M2 | SYSTOLIC BLOOD PRESSURE: 110 MMHG

## 2023-01-19 DIAGNOSIS — F41.9 ANXIETY: ICD-10-CM

## 2023-01-19 DIAGNOSIS — K21.9 GASTROESOPHAGEAL REFLUX DISEASE WITHOUT ESOPHAGITIS: ICD-10-CM

## 2023-01-19 DIAGNOSIS — R11.0 NAUSEA: ICD-10-CM

## 2023-01-19 DIAGNOSIS — K58.2 IRRITABLE BOWEL SYNDROME WITH BOTH CONSTIPATION AND DIARRHEA: ICD-10-CM

## 2023-01-19 DIAGNOSIS — F40.298 OTHER SPECIFIED PHOBIA: Primary | ICD-10-CM

## 2023-01-19 PROCEDURE — G8484 FLU IMMUNIZE NO ADMIN: HCPCS | Performed by: FAMILY MEDICINE

## 2023-01-19 PROCEDURE — 99214 OFFICE O/P EST MOD 30 MIN: CPT | Performed by: FAMILY MEDICINE

## 2023-01-19 PROCEDURE — G8419 CALC BMI OUT NRM PARAM NOF/U: HCPCS | Performed by: FAMILY MEDICINE

## 2023-01-19 PROCEDURE — 1036F TOBACCO NON-USER: CPT | Performed by: FAMILY MEDICINE

## 2023-01-19 PROCEDURE — G8427 DOCREV CUR MEDS BY ELIG CLIN: HCPCS | Performed by: FAMILY MEDICINE

## 2023-01-19 RX ORDER — PROMETHAZINE HYDROCHLORIDE 12.5 MG/1
12.5 TABLET ORAL 3 TIMES DAILY PRN
Qty: 30 TABLET | Refills: 1 | Status: SHIPPED | OUTPATIENT
Start: 2023-01-19

## 2023-01-19 RX ORDER — BUSPIRONE HYDROCHLORIDE 10 MG/1
10 TABLET ORAL 2 TIMES DAILY
Qty: 60 TABLET | Refills: 5 | Status: SHIPPED | OUTPATIENT
Start: 2023-01-19

## 2023-01-19 NOTE — PROGRESS NOTES
Name: Shila Toro  : 2000         Chief Complaint:     Chief Complaint   Patient presents with    Abdominal Pain     Loss of appetite over the last month. Has nausea, [place], had slight constipation but now has diarrhea in the mornings. Feels ill overall. History of Present Illness:      Shila Toro is a 25 y.o.  female who presents with Abdominal Pain (Loss of appetite over the last month. Has nausea, [place], had slight constipation but now has diarrhea in the mornings. Feels ill overall. )      HPI    About the past month has had poor appetite, for a little bit was really averse to dinner in particular but getting better now. A lot of anxiety with fear of throwing up. Last vomited about 5 yrs ago. A little over a month ago, before xmas, brother had stomach bug and after that pt started getting very nervous that she'd get sick. Still finds self thinking about it a lot, manuel before and after dinner. Poor sleep and if she wakes up during the night she'll make an assumption that she's getting sick. Avoids certain textures like rice, spaghetti (which family tends to eat a lot), sticks with crackers, toast, eggs, buttered noodles. Food aversion may be just a little worse around other people, anxious about people noticing when she's eating less. Family not mean or teasing about it. Mom helps pt make sure she eats enough. Some nausea early in the morning and also at nighttime. Yesterday AM had a lot of heartburn, which she's had in the past also. No current antacid tx. Has zofran on-hand and uses it here and there if feeling nauseated. A couple nights she also tried phenergan her mom had and it seemed more effective manuel with helping her fall asleep. Had had some diarrhea, thought d/t zoloft, so she stopped taking it and then had constipation. Restarted med and then had diarrhea again so stopped med.  Over the past wk has been passing small, hard stools, though this morning it was better, soft and a decent amount. Not using bentyl right now. When she gets belly pain it feels like a tightness in lower abdomen. Did have that for a few wks straight, then one night about 1.5 wks ago had a fair amt of LLQ pain manuel with walking or laying a certain way so she wonders whether ovarian cyst ruptured. Some upper abd pain which she r/t heartburn. Took omeprazole and tums yesterday and felt those things helped. Few yrs ago had taken omeprazole regularly. Prescribed zoloft and buspar but not taking zoloft and usually takes buspar TID, yesterday only twice. Medical History:     Patient Active Problem List   Diagnosis    Dysmenorrhea    Moderate episode of recurrent major depressive disorder (HCC)    Acquired hallux valgus    Plantar fascial fibromatosis    Shin splint of right lower extremity       Medications:       Prior to Admission medications    Medication Sig Start Date End Date Taking? Authorizing Provider   busPIRone (BUSPAR) 10 MG tablet Take 1 tablet by mouth 2 times daily 1/19/23  Yes Sonali Heap, DO   promethazine (PHENERGAN) 12.5 MG tablet Take 1 tablet by mouth 3 times daily as needed for Nausea 1/19/23  Yes Sonali Heap, DO   clindamycin-benzoyl peroxide (BENZACLIN) 1-5 % gel Apply topically 2 times daily Apply topically 2 times daily. 12/29/22   Sujata Gutting, APRN - CNP   [place] 0.3-30 MG-MCG per tablet take 1 tablet by mouth once daily 7/20/22   Sujata Gutting, APRN - CNP        Allergies:       Bee venom and Pcn [penicillins]    Physical Exam:     Vitals:  /70   Pulse 96   Ht 5' 2\" (1.575 m)   Wt 147 lb (66.7 kg)   LMP 12/07/2022   SpO2 98%   BMI 26.89 kg/m²   Physical Exam  Vitals and nursing note reviewed. Constitutional:       Appearance: Normal appearance. She is well-developed. She is not ill-appearing.    HENT:      Right Ear: Hearing and tympanic membrane normal.      Left Ear: Hearing and tympanic membrane normal.      Nose: Nose normal. No mucosal edema.      Mouth/Throat:      Mouth: Mucous membranes are moist.      Pharynx: Oropharynx is clear. Eyes:      Pupils: Pupils are equal, round, and reactive to light. Neck:      Thyroid: No thyroid mass or thyromegaly. Cardiovascular:      Rate and Rhythm: Normal rate and regular rhythm. Heart sounds: S1 normal and S2 normal. No murmur heard. Pulmonary:      Effort: Pulmonary effort is normal.      Breath sounds: Normal breath sounds. Abdominal:      General: Bowel sounds are normal.      Palpations: Abdomen is soft. Tenderness: There is abdominal tenderness (mild lower abd, no worse R vs L). Lymphadenopathy:      Cervical: No cervical adenopathy. Skin:     General: Skin is warm and dry. Findings: No rash. Neurological:      Mental Status: She is alert and oriented to person, place, and time.    Psychiatric:         Mood and Affect: Mood normal.         Behavior: Behavior normal.       Data:     Lab Results   Component Value Date/Time     09/24/2022 10:03 AM    K 4.2 09/24/2022 10:03 AM     09/24/2022 10:03 AM    CO2 22 09/24/2022 10:03 AM    BUN 12 09/24/2022 10:03 AM    CREATININE 0.73 09/24/2022 10:03 AM    GLUCOSE 94 09/24/2022 10:03 AM    PROT 6.7 09/24/2022 10:03 AM    LABALBU 4.2 09/24/2022 10:03 AM    BILITOT 0.4 09/24/2022 10:03 AM    ALKPHOS 73 09/24/2022 10:03 AM    AST 16 09/24/2022 10:03 AM    ALT 20 09/24/2022 10:03 AM     Lab Results   Component Value Date/Time    WBC 10.9 09/24/2022 10:03 AM    RBC 4.80 09/24/2022 10:03 AM    HGB 14.5 09/24/2022 10:03 AM    HCT 41.9 09/24/2022 10:03 AM    MCV 87.3 09/24/2022 10:03 AM    MCH 30.1 09/24/2022 10:03 AM    MCHC 34.5 09/24/2022 10:03 AM    RDW 12.5 09/24/2022 10:03 AM     09/24/2022 10:03 AM    MPV NOT REPORTED 10/05/2021 09:47 AM     Lab Results   Component Value Date/Time    TSH 2.23 12/02/2022 10:18 AM     No results found for: CHOL, LDL, HDL, PSA, LABA1C      Assessment & Plan:        Diagnosis Orders 1. Other specified phobia        2. Anxiety        3. Nausea        4. Gastroesophageal reflux disease without esophagitis        5. Irritable bowel syndrome with both constipation and diarrhea          1-2. Patient with significant fear of vomiting, has been set off for weeks by brother vomiting. Continue counseling. Advised to try accepting the feelings and also accepting that she will likely vomit again at some point and would survive the experience. Didn't tolerate zoloft d/t diarrhea but is tolerating buspar. Changing to BID dosing for convenience, 10 mg BID instead of 5 TID.   3-5. Does have some GI symptoms also, which could be worse d/t the anxiety and emetophobia. Phenergan prn. Stop zofran since it doesn't work as well and may cause constipation. Ok to cont tums prn for heartburn and may also use pepcid. F/u if not improving. Some recent weight loss but otherwise no red flag symptoms - recent normal labs, no nocturnal BM's or pain waking from sleep. No vomiting, melena, hematochezia.          Requested Prescriptions     Signed Prescriptions Disp Refills    busPIRone (BUSPAR) 10 MG tablet 60 tablet 5     Sig: Take 1 tablet by mouth 2 times daily    promethazine (PHENERGAN) 12.5 MG tablet 30 tablet 1     Sig: Take 1 tablet by mouth 3 times daily as needed for Nausea         There are no Patient Instructions on file for this visit.      signed by Vee Clark DO on 1/22/2023 at 9:50 PM  [address] [address]  Dept: [phone]

## 2023-01-19 NOTE — TELEPHONE ENCOUNTER
From: Reymundo Del Angel  To: Dr. Chelly Delgado: 1/19/2023 1:28 PM EST  Subject: Hector Cisneros! I wanted to ask you a question that has been on my mind. Do you have any indication that this could be MALS disease? Nicole done some research on it - so I just wanted to rule that out. I dont mean to self diagnose I just figured it would be worth asking. I know some of the symptoms are similar but like I said just wanted to check with you! Thanks!   Reymundo Del Angel

## 2023-01-23 RX ORDER — ONDANSETRON 4 MG/1
4 TABLET, ORALLY DISINTEGRATING ORAL 3 TIMES DAILY PRN
Qty: 30 TABLET | Refills: 0 | Status: SHIPPED | OUTPATIENT
Start: 2023-01-23

## 2023-01-23 RX ORDER — ONDANSETRON 4 MG/1
TABLET, ORALLY DISINTEGRATING ORAL
Qty: 30 TABLET | Refills: 0 | OUTPATIENT
Start: 2023-01-23

## 2023-01-24 ENCOUNTER — PATIENT MESSAGE (OUTPATIENT)
Dept: FAMILY MEDICINE CLINIC | Age: 23
End: 2023-01-24

## 2023-01-24 NOTE — LETTER
[facility] [address] [address]  Phone: [phone]  Fax: Jigna 106, DO        January 25, 2023     Patient: Manju Bullard   YOB: 2000           To Whom it May Concern:    Manju Bullard has been diagnosed with generalized anxiety disorder. This is associated with physical symptoms as well as panic attacks at times. If you have any questions or concerns, please don't hesitate to call.     Sincerely,         Guerline Mack DO

## 2023-01-25 NOTE — TELEPHONE ENCOUNTER
From: Cesia Alvarez  To: Dr. Henry Seek: 1/24/2023 10:39 PM EST  Subject: Vera Neither Dr. Brett Crowe! As I met with you recently and discussed the most with you regarding whats been going on with me and my anxiety - I wanted to ask you if you would be willing to write an email to my school regarding my anxiety diagnosis and basically just how it has been affecting me. As I mentioned to you - I have lost about 15 pounds and am not eating much or sleeping much & am having a very hard time concentrating at school right now due to my panick attacks. My professors thought it would be wise to have an email sent to our school (I can forward it to them) that way if my panick attacks or anything become an issue at school - they have documentation. Sorry, I dont mean to message you so late - I just know Ill forget to message you tomorrow! Anything that youd be able to provide for me would be very helpful. I think they are having a hard time wrapping their heads around how this anxiety affects me physically a lot more than mentally. Thanks!    Cesia Alvarez

## 2023-02-01 RX ORDER — ONDANSETRON 4 MG/1
4 TABLET, ORALLY DISINTEGRATING ORAL 3 TIMES DAILY PRN
Qty: 30 TABLET | Refills: 0 | Status: SHIPPED | OUTPATIENT
Start: 2023-02-01

## 2023-02-03 RX ORDER — BUSPIRONE HYDROCHLORIDE 5 MG/1
TABLET ORAL
Qty: 90 TABLET | Refills: 0 | OUTPATIENT
Start: 2023-02-03

## 2023-02-04 ENCOUNTER — PATIENT MESSAGE (OUTPATIENT)
Dept: FAMILY MEDICINE CLINIC | Age: 23
End: 2023-02-04

## 2023-02-06 NOTE — TELEPHONE ENCOUNTER
From: Gosia Kramer  To: Tiffanie Son  Sent: 2/4/2023 9:10 PM EST  Subject: Sore throat     Hi Ezekiel! I wanted to send you a picture of my throat. Nicole had a bad sore throat all day and my head feels congested. Last night I didnt have a fever - but I did feel really achy and just cold. I tend to get strep a lot so I wanted to show you what it looked like. I didnt Nan Grandchild come in if it just looks a little irritated and might be a cold - but if it looks like strep Ill come in to get tested. Thanks!   Gosia Kramer

## 2023-02-08 RX ORDER — ONDANSETRON 4 MG/1
4 TABLET, ORALLY DISINTEGRATING ORAL 3 TIMES DAILY PRN
Qty: 30 TABLET | Refills: 0 | OUTPATIENT
Start: 2023-02-08

## 2023-02-08 NOTE — TELEPHONE ENCOUNTER
Sent 2/1      Last visit:  1/19/2023  Next Visit Date:  No future appointments. Medication List:  Prior to Admission medications    Medication Sig Start Date End Date Taking? Authorizing Provider   ondansetron (ZOFRAN-ODT) 4 MG disintegrating tablet Take 1 tablet by mouth 3 times daily as needed for Nausea or Vomiting 2/1/23   Ashley Kay,    busPIRone (BUSPAR) 10 MG tablet Take 1 tablet by mouth 2 times daily 1/19/23   Ashley Kay,    clindamycin-benzoyl peroxide (BENZACLIN) 1-5 % gel Apply topically 2 times daily Apply topically 2 times daily.  12/29/22   ALEXIS Steinberg - [facility]   [facility] [place] 0.3-30 MG-MCG per tablet take 1 tablet by mouth once daily 7/20/22   ALEXIS Steinberg - CNP

## 2023-02-10 RX ORDER — ONDANSETRON 4 MG/1
4 TABLET, ORALLY DISINTEGRATING ORAL 3 TIMES DAILY PRN
Qty: 30 TABLET | Refills: 0 | Status: SHIPPED | OUTPATIENT
Start: 2023-02-10

## 2023-02-10 NOTE — TELEPHONE ENCOUNTER
Was given 30 on 2/1      Last visit:  1/19/2023  Next Visit Date:  No future appointments. Medication List:  Prior to Admission medications    Medication Sig Start Date End Date Taking? Authorizing Provider   ondansetron (ZOFRAN-ODT) 4 MG disintegrating tablet Take 1 tablet by mouth 3 times daily as needed for Nausea or Vomiting 2/1/23   Rashida Gerber,    busPIRone (BUSPAR) 10 MG tablet Take 1 tablet by mouth 2 times daily 1/19/23   Rashida Gerber,    clindamycin-benzoyl peroxide (BENZACLIN) 1-5 % gel Apply topically 2 times daily Apply topically 2 times daily.  12/29/22   ALEXIS Fox CNP   [place] 0.3-30 MG-MCG per tablet take 1 tablet by mouth once daily 7/20/22   ALEXIS Fox CNP

## 2023-02-19 RX ORDER — ONDANSETRON 4 MG/1
4 TABLET, ORALLY DISINTEGRATING ORAL 3 TIMES DAILY PRN
Qty: 30 TABLET | Refills: 0 | Status: CANCELLED | OUTPATIENT
Start: 2023-02-19

## 2023-02-21 RX ORDER — ONDANSETRON 4 MG/1
4 TABLET, ORALLY DISINTEGRATING ORAL 3 TIMES DAILY PRN
Qty: 30 TABLET | Refills: 0 | OUTPATIENT
Start: 2023-02-21

## 2023-02-21 RX ORDER — ONDANSETRON 4 MG/1
TABLET, ORALLY DISINTEGRATING ORAL
Qty: 90 TABLET | Refills: 0 | Status: SHIPPED | OUTPATIENT
Start: 2023-02-21

## 2023-02-21 NOTE — TELEPHONE ENCOUNTER
Last OV: 1/19/2023 chronic   Last RX:    Next scheduled apt: Visit date not found          Surescript requesting a refill

## 2023-03-02 ENCOUNTER — PATIENT MESSAGE (OUTPATIENT)
Dept: FAMILY MEDICINE CLINIC | Age: 23
End: 2023-03-02

## 2023-03-02 RX ORDER — ONDANSETRON 4 MG/1
TABLET, ORALLY DISINTEGRATING ORAL
Qty: 90 TABLET | Refills: 0 | OUTPATIENT
Start: 2023-03-02

## 2023-03-02 NOTE — TELEPHONE ENCOUNTER
From: Kim Pizarro  To: Wale Sheets  Sent: 3/2/2023 4:41 PM EST  Subject: Follow up to previous message regarding Gavin Almeida! I wanted to let you know that I looked up which prescriptions [facility] has for me right now and zofran is not one of them. Thanks!   Kim Pizarro

## 2023-04-17 RX ORDER — NORGESTREL AND ETHINYL ESTRADIOL 0.3-0.03MG
1 KIT ORAL DAILY
Qty: 56 TABLET | Refills: 5 | Status: SHIPPED | OUTPATIENT
Start: 2023-04-17

## 2023-05-04 RX ORDER — ONDANSETRON 4 MG/1
4 TABLET, ORALLY DISINTEGRATING ORAL EVERY 8 HOURS PRN
Qty: 90 TABLET | Refills: 1 | Status: SHIPPED | OUTPATIENT
Start: 2023-05-04

## 2023-05-04 NOTE — TELEPHONE ENCOUNTER
Last OV: 1/19/2023  chronic   Last RX:    Next scheduled apt: Visit date not found        Pt requesting a refill

## 2023-07-28 ENCOUNTER — PATIENT MESSAGE (OUTPATIENT)
Dept: FAMILY MEDICINE CLINIC | Age: 23
End: 2023-07-28

## 2023-07-28 NOTE — TELEPHONE ENCOUNTER
From: Mayank Limon  To: Meghna Quinones  Sent: 7/28/2023 11:52 AM EDT  Subject: Form    Hello! 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 I have the form, but it needs to be sent through an email. Do you have an email I can send this to you to be filled out?      Thanks very much,  Mayank Limon

## 2023-08-02 RX ORDER — ONDANSETRON 4 MG/1
4 TABLET, ORALLY DISINTEGRATING ORAL EVERY 8 HOURS PRN
Qty: 90 TABLET | Refills: 1 | Status: SHIPPED | OUTPATIENT
Start: 2023-08-02

## 2023-08-10 ENCOUNTER — PATIENT MESSAGE (OUTPATIENT)
Dept: FAMILY MEDICINE CLINIC | Age: 23
End: 2023-08-10

## 2023-09-19 RX ORDER — NORGESTREL AND ETHINYL ESTRADIOL 0.3-0.03MG
1 KIT ORAL DAILY
Qty: 56 TABLET | Refills: 5 | Status: SHIPPED | OUTPATIENT
Start: 2023-09-19

## 2023-09-19 NOTE — TELEPHONE ENCOUNTER
Last OV: 1/19/2023  chronic 12/02/22 dysmenorrhea   Last RX:    Next scheduled apt: Visit date not found              Pt requesting a refill to [id number] [id number]

## 2023-10-02 RX ORDER — ONDANSETRON 4 MG/1
4 TABLET, ORALLY DISINTEGRATING ORAL EVERY 8 HOURS PRN
Qty: 90 TABLET | Refills: 1 | Status: SHIPPED | OUTPATIENT
Start: 2023-10-02

## 2023-10-11 ENCOUNTER — PATIENT MESSAGE (OUTPATIENT)
Dept: FAMILY MEDICINE CLINIC | Age: 23
End: 2023-10-11

## 2023-10-11 RX ORDER — NORGESTREL AND ETHINYL ESTRADIOL 0.3-0.03MG
1 KIT ORAL DAILY
Qty: 56 TABLET | Refills: 5 | Status: SHIPPED | OUTPATIENT
Start: 2023-10-11

## 2023-10-11 RX ORDER — ONDANSETRON 4 MG/1
4 TABLET, ORALLY DISINTEGRATING ORAL EVERY 8 HOURS PRN
Qty: 90 TABLET | Refills: 1 | Status: SHIPPED | OUTPATIENT
Start: 2023-10-11

## 2023-10-11 NOTE — TELEPHONE ENCOUNTER
Last visit:  1/19/2023  Next Visit Date:  No future appointments. Medication List:  Prior to Admission medications    Medication Sig Start Date End Date Taking? Authorizing Provider   ondansetron (ZOFRAN-ODT) 4 MG disintegrating tablet Take 1 tablet by mouth every 8 hours as needed for Nausea or Vomiting 10/11/23   ALEXIS Fraser CNP   norgestrel-ethinyl estradiol (ELINEST) 0.3-30 MG-MCG per tablet Take 1 tablet by mouth daily 10/11/23   ALEXIS Fraser CNP   busPIRone (BUSPAR) 10 MG tablet Take 1 tablet by mouth 2 times daily 1/19/23   Jesus Alberto Marsh,    clindamycin-benzoyl peroxide (BENZACLIN) 1-5 % gel Apply topically 2 times daily Apply topically 2 times daily.  12/29/22   ALEXIS Fraser CNP

## 2023-10-11 NOTE — TELEPHONE ENCOUNTER
Last OV 1/19/23 for anxiety, nausea, GERD  Requesting refill on zofran to [facility] [place] thru my chart

## 2023-11-02 ENCOUNTER — OFFICE VISIT (OUTPATIENT)
Age: 23
End: 2023-11-02

## 2023-11-02 VITALS
RESPIRATION RATE: 22 BRPM | OXYGEN SATURATION: 94 % | HEART RATE: 85 BPM | TEMPERATURE: 99.2 F | SYSTOLIC BLOOD PRESSURE: 120 MMHG | DIASTOLIC BLOOD PRESSURE: 67 MMHG

## 2023-11-02 DIAGNOSIS — J02.9 SORETHROAT: Primary | ICD-10-CM

## 2023-11-02 RX ORDER — CEPHALEXIN 500 MG/1
500 CAPSULE ORAL 2 TIMES DAILY
Qty: 14 CAPSULE | Refills: 0 | Status: SHIPPED | OUTPATIENT
Start: 2023-11-02 | End: 2023-11-09

## 2023-11-02 ASSESSMENT — ENCOUNTER SYMPTOMS: SORE THROAT: 1

## 2023-11-03 NOTE — PROGRESS NOTES
Thanh Pleitez (:  2000) is a 26 y.o. female,Established patient, here for evaluation of the following chief complaint(s):  No chief complaint on file. ASSESSMENT/PLAN:  Diagnosis:     Home to rest  Increase fluids   Tylenol or motrin for pain/fever    Follow up in 7 days if symptoms persist or if symptoms worsen. SUBJECTIVE/OBJECTIVE:  Jpatient states she has sorethroat body aches fatigue and anorexia with dizziness   States she almost passed out today               There were no vitals filed for this visit. Review of Systems   Constitutional:  Positive for fatigue. HENT:  Positive for sore throat. Physical Exam  Constitutional:       Appearance: Normal appearance. HENT:      Head: Normocephalic. Nose: Nose normal.      Mouth/Throat:      Mouth: Mucous membranes are dry. Pharynx: Oropharyngeal exudate and posterior oropharyngeal erythema present. Cardiovascular:      Rate and Rhythm: Regular rhythm. Heart sounds: Normal heart sounds. Pulmonary:      Breath sounds: Normal breath sounds. Abdominal:      Palpations: Abdomen is soft. Musculoskeletal:         General: Normal range of motion. Cervical back: Normal range of motion. Skin:     General: Skin is warm and dry. Neurological:      Mental Status: She is alert and oriented to person, place, and time. An electronic signature was used to authenticate this note.     --ALEXIS Levy - CNP

## 2023-12-19 RX ORDER — ONDANSETRON 4 MG/1
4 TABLET, ORALLY DISINTEGRATING ORAL EVERY 8 HOURS PRN
Qty: 90 TABLET | Refills: 1 | Status: CANCELLED | OUTPATIENT
Start: 2023-12-19

## 2023-12-20 NOTE — TELEPHONE ENCOUNTER
Patient has not been seen in this office since 1/19/23. Patient changed her PCP to Jovita Ma CNP at [facility] on 1/26/23. Requesting Zofran refill

## 2024-01-10 ENCOUNTER — HOSPITAL ENCOUNTER (OUTPATIENT)
Age: 24
Discharge: HOME | End: 2024-01-10
Payer: COMMERCIAL

## 2024-01-10 ENCOUNTER — OFFICE VISIT (OUTPATIENT)
Dept: FAMILY MEDICINE CLINIC | Age: 24
End: 2024-01-10
Payer: COMMERCIAL

## 2024-01-10 VITALS
HEART RATE: 93 BPM | BODY MASS INDEX: 22.57 KG/M2 | SYSTOLIC BLOOD PRESSURE: 108 MMHG | OXYGEN SATURATION: 99 % | WEIGHT: 123.4 LBS | DIASTOLIC BLOOD PRESSURE: 74 MMHG

## 2024-01-10 DIAGNOSIS — R23.3 BRUISING, SPONTANEOUS: Primary | ICD-10-CM

## 2024-01-10 DIAGNOSIS — R53.83 FATIGUE, UNSPECIFIED TYPE: ICD-10-CM

## 2024-01-10 LAB
25(OH)D3 SERPL-MCNC: 42.1 NG/ML
ALBUMIN SERPL-MCNC: 4.4 G/DL (ref 3.5–5.2)
ALP SERPL-CCNC: 42 U/L (ref 35–104)
ALT SERPL-CCNC: 15 U/L (ref 5–33)
ANION GAP SERPL CALCULATED.3IONS-SCNC: 11 MMOL/L (ref 9–17)
AST SERPL-CCNC: 14 U/L
BASOPHILS # BLD: 0.03 K/UL (ref 0–0.2)
BASOPHILS NFR BLD: 0 % (ref 0–2)
BILIRUB SERPL-MCNC: 0.4 MG/DL (ref 0.3–1.2)
BUN SERPL-MCNC: 10 MG/DL (ref 6–20)
BUN/CREAT SERPL: 13 (ref 9–20)
CALCIUM SERPL-MCNC: 9.4 MG/DL (ref 8.6–10.4)
CHLORIDE SERPL-SCNC: 101 MMOL/L (ref 98–107)
CO2 SERPL-SCNC: 23 MMOL/L (ref 20–31)
CREAT SERPL-MCNC: 0.8 MG/DL (ref 0.5–0.9)
EOSINOPHIL # BLD: 0.12 K/UL (ref 0–0.4)
EOSINOPHILS RELATIVE PERCENT: 2 % (ref 0–5)
ERYTHROCYTE [DISTWIDTH] IN BLOOD BY AUTOMATED COUNT: 11.4 % (ref 12.1–15.2)
FERRITIN SERPL-MCNC: 41 NG/ML (ref 13–150)
GFR SERPL CREATININE-BSD FRML MDRD: >60 ML/MIN/1.73M2
GLUCOSE SERPL-MCNC: 90 MG/DL (ref 70–99)
HCT VFR BLD AUTO: 41.6 % (ref 36–46)
HETEROPH AB BLD QL IA: NEGATIVE
HGB BLD-MCNC: 14.6 G/DL (ref 12–16)
IMM GRANULOCYTES # BLD AUTO: 0.01 K/UL (ref 0–0.3)
IMM GRANULOCYTES NFR BLD: 0 % (ref 0–5)
IRON SATN MFR SERPL: 32 % (ref 20–55)
IRON SERPL-MCNC: 97 UG/DL (ref 37–145)
LYMPHOCYTES NFR BLD: 2.35 K/UL (ref 1–4.8)
LYMPHOCYTES RELATIVE PERCENT: 31 % (ref 15–40)
MCH RBC QN AUTO: 30.9 PG (ref 26–34)
MCHC RBC AUTO-ENTMCNC: 35.1 G/DL (ref 31–37)
MCV RBC AUTO: 87.9 FL (ref 80–100)
MONOCYTES NFR BLD: 0.45 K/UL (ref 0–1)
MONOCYTES NFR BLD: 6 % (ref 4–8)
NEUTROPHILS NFR BLD: 61 % (ref 47–75)
NEUTS SEG NFR BLD: 4.56 K/UL (ref 2.5–7)
PLATELET # BLD AUTO: 306 K/UL (ref 140–450)
PMV BLD AUTO: 10.2 FL (ref 6–12)
POTASSIUM SERPL-SCNC: 4.3 MMOL/L (ref 3.7–5.3)
PROT SERPL-MCNC: 7.2 G/DL (ref 6.4–8.3)
RBC # BLD AUTO: 4.73 M/UL (ref 4–5.2)
SODIUM SERPL-SCNC: 135 MMOL/L (ref 135–144)
TIBC SERPL-MCNC: 306 UG/DL (ref 250–450)
UNSATURATED IRON BINDING CAPACITY: 209 UG/DL (ref 112–347)
WBC OTHER # BLD: 7.5 K/UL (ref 3.5–11)

## 2024-01-10 PROCEDURE — 80053 COMPREHEN METABOLIC PANEL: CPT

## 2024-01-10 PROCEDURE — 86308 HETEROPHILE ANTIBODY SCREEN: CPT

## 2024-01-10 PROCEDURE — 36415 COLL VENOUS BLD VENIPUNCTURE: CPT

## 2024-01-10 PROCEDURE — 83540 ASSAY OF IRON: CPT

## 2024-01-10 PROCEDURE — 83550 IRON BINDING TEST: CPT

## 2024-01-10 PROCEDURE — 85025 COMPLETE CBC W/AUTO DIFF WBC: CPT

## 2024-01-10 PROCEDURE — 82306 VITAMIN D 25 HYDROXY: CPT

## 2024-01-10 PROCEDURE — 82728 ASSAY OF FERRITIN: CPT

## 2024-01-10 PROCEDURE — 99213 OFFICE O/P EST LOW 20 MIN: CPT | Performed by: NURSE PRACTITIONER

## 2024-01-10 ASSESSMENT — PATIENT HEALTH QUESTIONNAIRE - PHQ9
6. FEELING BAD ABOUT YOURSELF - OR THAT YOU ARE A FAILURE OR HAVE LET YOURSELF OR YOUR FAMILY DOWN: 0
3. TROUBLE FALLING OR STAYING ASLEEP: 0
5. POOR APPETITE OR OVEREATING: 0
8. MOVING OR SPEAKING SO SLOWLY THAT OTHER PEOPLE COULD HAVE NOTICED. OR THE OPPOSITE, BEING SO FIGETY OR RESTLESS THAT YOU HAVE BEEN MOVING AROUND A LOT MORE THAN USUAL: 0
10. IF YOU CHECKED OFF ANY PROBLEMS, HOW DIFFICULT HAVE THESE PROBLEMS MADE IT FOR YOU TO DO YOUR WORK, TAKE CARE OF THINGS AT HOME, OR GET ALONG WITH OTHER PEOPLE: 0
SUM OF ALL RESPONSES TO PHQ QUESTIONS 1-9: 0
4. FEELING TIRED OR HAVING LITTLE ENERGY: 0
1. LITTLE INTEREST OR PLEASURE IN DOING THINGS: 0
9. THOUGHTS THAT YOU WOULD BE BETTER OFF DEAD, OR OF HURTING YOURSELF: 0
SUM OF ALL RESPONSES TO PHQ QUESTIONS 1-9: 0
SUM OF ALL RESPONSES TO PHQ9 QUESTIONS 1 & 2: 0
2. FEELING DOWN, DEPRESSED OR HOPELESS: 0
SUM OF ALL RESPONSES TO PHQ QUESTIONS 1-9: 0
7. TROUBLE CONCENTRATING ON THINGS, SUCH AS READING THE NEWSPAPER OR WATCHING TELEVISION: 0
SUM OF ALL RESPONSES TO PHQ QUESTIONS 1-9: 0

## 2024-01-10 ASSESSMENT — ENCOUNTER SYMPTOMS
NAUSEA: 0
SHORTNESS OF BREATH: 0
COUGH: 0
DIARRHEA: 0
VOMITING: 0

## 2024-01-10 NOTE — PROGRESS NOTES
HPI Notes    Name: Kathy Osorio  : 2000         Chief Complaint:     Chief Complaint   Patient presents with    Bleeding/Bruising     Patient complains of bruising on both inner thighs, no injury. Started 1 week ago.        History of Present Illness:        HPI  Patient is a 23-year-old female who reports for bruising to her abdomen and inner thighs.  Patient denies any injury.  First noticed about 1 week ago.  Patient states that bruises started as a small red round papule and then progressed to a larger bruise.  Denies fever or chills.  Has also complained of fatigue.  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    Past Medical History:     No past medical history on file.   Reviewed all health maintenance requirements and ordered appropriate tests  Health Maintenance Due   Topic Date Due    COVID-19 Vaccine (1) Never done    Varicella vaccine (1 of 2 - 2-dose childhood series) Never done    HPV vaccine (1 - 2-dose series) Never done    HIV screen  Never done    Hepatitis C screen  Never done    Chlamydia/GC screen  2021    Pap smear  Never done    Flu vaccine (1) Never done    DTaP/Tdap/Td vaccine (7 - Td or Tdap) 10/22/2023    Depression Monitoring  2024       Past Surgical History:     Past Surgical History:   Procedure Laterality Date    HERNIA REPAIR      MYRINGOTOMY AND TYMPANOSTOMY TUBE PLACEMENT          Medications:       Prior to Admission medications    Medication Sig Start Date End Date Taking? Authorizing Provider   ondansetron (ZOFRAN-ODT) 4 MG disintegrating tablet Take 1 tablet by mouth every 8 hours as needed for Nausea or Vomiting 23  Yes Nilo Aquino APRN - CNP   venlafaxine (EFFEXOR) 75 MG tablet Take 2 tablets by mouth daily 23  Yes Nilo Aquino APRN - [facility]   norgestrel-ethinyl estradiol (ELINEST) 0.3-30 MG-MCG per tablet Take 1

## 2024-01-19 RX ORDER — ONDANSETRON 4 MG/1
4 TABLET, ORALLY DISINTEGRATING ORAL EVERY 8 HOURS PRN
Qty: 90 TABLET | Refills: 1 | Status: SHIPPED | OUTPATIENT
Start: 2024-01-19

## 2024-01-19 NOTE — TELEPHONE ENCOUNTER
Last OV 01/10/24     Requesting refill on ondansetron thru Mychart.  Rx pending     Next OV not scheduled

## 2024-02-09 ENCOUNTER — HOSPITAL ENCOUNTER (OUTPATIENT)
Age: 24
Setting detail: SPECIMEN
Discharge: HOME | End: 2024-02-09
Payer: COMMERCIAL

## 2024-02-09 ENCOUNTER — OFFICE VISIT (OUTPATIENT)
Dept: FAMILY MEDICINE CLINIC | Age: 24
End: 2024-02-09
Payer: COMMERCIAL

## 2024-02-09 VITALS
SYSTOLIC BLOOD PRESSURE: 108 MMHG | DIASTOLIC BLOOD PRESSURE: 68 MMHG | BODY MASS INDEX: 22.89 KG/M2 | OXYGEN SATURATION: 98 % | HEIGHT: 62 IN | HEART RATE: 96 BPM | WEIGHT: 124.4 LBS

## 2024-02-09 DIAGNOSIS — R10.30 LOWER ABDOMINAL PAIN: ICD-10-CM

## 2024-02-09 DIAGNOSIS — N89.8 VAGINAL DISCHARGE: ICD-10-CM

## 2024-02-09 DIAGNOSIS — R10.30 LOWER ABDOMINAL PAIN: Primary | ICD-10-CM

## 2024-02-09 LAB
BILIRUBIN, POC: NORMAL
BLOOD URINE, POC: NORMAL
CLARITY, POC: CLEAR
COLOR, POC: YELLOW
GLUCOSE URINE, POC: NORMAL
KETONES, POC: NORMAL
LEUKOCYTE EST, POC: NORMAL
NITRITE, POC: NORMAL
PH, POC: 6.5
PROTEIN, POC: NORMAL
SPECIFIC GRAVITY, POC: 1.01
UROBILINOGEN, POC: 0.2

## 2024-02-09 PROCEDURE — 81002 URINALYSIS NONAUTO W/O SCOPE: CPT | Performed by: NURSE PRACTITIONER

## 2024-02-09 PROCEDURE — 86403 PARTICLE AGGLUT ANTBDY SCRN: CPT

## 2024-02-09 PROCEDURE — 87480 CANDIDA DNA DIR PROBE: CPT

## 2024-02-09 PROCEDURE — 99213 OFFICE O/P EST LOW 20 MIN: CPT | Performed by: NURSE PRACTITIONER

## 2024-02-09 PROCEDURE — 87510 GARDNER VAG DNA DIR PROBE: CPT

## 2024-02-09 PROCEDURE — 87660 TRICHOMONAS VAGIN DIR PROBE: CPT

## 2024-02-09 PROCEDURE — 87086 URINE CULTURE/COLONY COUNT: CPT

## 2024-02-09 RX ORDER — NORETHINDRONE ACETATE AND ETHINYL ESTRADIOL, ETHINYL ESTRADIOL AND FERROUS FUMARATE 1MG-10(24)
1 KIT ORAL DAILY
COMMUNITY
Start: 2024-01-12

## 2024-02-09 ASSESSMENT — ENCOUNTER SYMPTOMS
DIARRHEA: 0
ABDOMINAL PAIN: 1
VOMITING: 0
SHORTNESS OF BREATH: 0
COUGH: 0
BLOOD IN STOOL: 0
NAUSEA: 0
CONSTIPATION: 0

## 2024-02-09 NOTE — PROGRESS NOTES
drug use.    Family History:     No family history on file.    Review of Systems:         Review of Systems   Constitutional:  Negative for chills and fever.   Respiratory:  Negative for cough and shortness of breath.    Cardiovascular:  Negative for chest pain and palpitations.   Gastrointestinal:  Positive for abdominal pain. Negative for blood in stool, constipation, diarrhea, nausea and vomiting.   Genitourinary:  Positive for vaginal discharge. Negative for vaginal bleeding.   Neurological:  Negative for dizziness, seizures and headaches.         Physical Exam:     Vitals:  /68   Pulse 96   Ht 1.575 m (5' 2\")   Wt 56.4 kg (124 lb 6.4 oz)   SpO2 98%   BMI 22.75 kg/m²       Physical Exam  Vitals and nursing note reviewed.   Constitutional:       Appearance: Normal appearance. She is well-developed.   Cardiovascular:      Rate and Rhythm: Normal rate and regular rhythm.      Heart sounds: Normal heart sounds, S1 normal and S2 normal.   Pulmonary:      Effort: Pulmonary effort is normal. No respiratory distress.      Breath sounds: Normal breath sounds.   Abdominal:      General: Bowel sounds are normal.      Palpations: Abdomen is soft.      Tenderness: There is abdominal tenderness in the suprapubic area. There is no right CVA tenderness or left CVA tenderness.   Skin:     General: Skin is warm and dry.   Neurological:      Mental Status: She is alert and oriented to person, place, and time.               Data:     Lab Results   Component Value Date/Time     01/10/2024 10:42 AM    K 4.3 01/10/2024 10:42 AM     01/10/2024 10:42 AM    CO2 23 01/10/2024 10:42 AM    BUN 10 01/10/2024 10:42 AM    CREATININE 0.8 01/10/2024 10:42 AM    GLUCOSE 90 01/10/2024 10:42 AM    PROT 7.2 01/10/2024 10:42 AM    LABALBU 4.4 01/10/2024 10:42 AM    BILITOT 0.4 01/10/2024 10:42 AM    ALKPHOS 42 01/10/2024 10:42 AM    AST 14 01/10/2024 10:42 AM    ALT 15 01/10/2024 10:42 AM     Lab Results   Component Value

## 2024-02-10 LAB
CANDIDA SPECIES: NEGATIVE
GARDNERELLA VAGINALIS: NEGATIVE
MICROORGANISM SPEC CULT: ABNORMAL
SOURCE: NORMAL
SPECIMEN DESCRIPTION: ABNORMAL
TRICHOMONAS: NEGATIVE

## 2024-02-12 ENCOUNTER — TELEPHONE (OUTPATIENT)
Dept: FAMILY MEDICINE CLINIC | Age: 24
End: 2024-02-12

## 2024-02-12 RX ORDER — CEPHALEXIN 500 MG/1
500 CAPSULE ORAL 2 TIMES DAILY
Qty: 10 CAPSULE | Refills: 0 | Status: SHIPPED | OUTPATIENT
Start: 2024-02-12 | End: 2024-02-17

## 2024-02-12 NOTE — TELEPHONE ENCOUNTER
----- Message from Nilo Aquino, AMADOU sent at 2/12/2024  9:03 AM EST -----  Please let pt know that her vaginal swab was negative. However, the urine culture did grow beta hemolytic strep. This type of UTI does not show up on our in office UA. I will treat her with keflex 500mg PO BID x 5 days. Please pend.

## 2024-02-17 ENCOUNTER — PATIENT MESSAGE (OUTPATIENT)
Dept: FAMILY MEDICINE CLINIC | Age: 24
End: 2024-02-17

## 2024-02-19 NOTE — TELEPHONE ENCOUNTER
From: Kathy Osorio  To: Nilo Aquino  Sent: 2/17/2024 12:37 PM EST  Subject: Acne    Hello! I wanted to reach out regarding my acne getting bad again. I was wondering if we could try something new as what we tried last time didn’t work super great. I can send you a picture of my acne if you need to see it at all.    Thanks!  Kathy Osorio

## 2024-02-26 ENCOUNTER — TELEPHONE (OUTPATIENT)
Dept: FAMILY MEDICINE CLINIC | Age: 24
End: 2024-02-26

## 2024-02-26 NOTE — TELEPHONE ENCOUNTER
----- Message from Kathy Osorio sent at 2/26/2024  2:39 PM EST -----  Regarding: Acne  Contact: [phone]  Thank you so much. Is [facility] [place]?

## 2024-03-07 ENCOUNTER — OFFICE VISIT (OUTPATIENT)
Dept: FAMILY MEDICINE CLINIC | Age: 24
End: 2024-03-07
Payer: COMMERCIAL

## 2024-03-07 VITALS
DIASTOLIC BLOOD PRESSURE: 62 MMHG | BODY MASS INDEX: 22.45 KG/M2 | OXYGEN SATURATION: 97 % | HEIGHT: 62 IN | SYSTOLIC BLOOD PRESSURE: 104 MMHG | HEART RATE: 92 BPM | WEIGHT: 122 LBS

## 2024-03-07 DIAGNOSIS — L70.0 PUSTULAR ACNE: ICD-10-CM

## 2024-03-07 DIAGNOSIS — R10.84 GENERALIZED ABDOMINAL PAIN: Primary | ICD-10-CM

## 2024-03-07 PROCEDURE — 99213 OFFICE O/P EST LOW 20 MIN: CPT | Performed by: NURSE PRACTITIONER

## 2024-03-07 ASSESSMENT — ENCOUNTER SYMPTOMS
COUGH: 0
CONSTIPATION: 0
NAUSEA: 0
VOMITING: 0
SHORTNESS OF BREATH: 0
BLOOD IN STOOL: 0
DIARRHEA: 0
ABDOMINAL PAIN: 1

## 2024-03-07 NOTE — PROGRESS NOTES
PELVIS W IV CONTRAST Additional Contrast? Radiologist Recommendation     Standing Status:   Future     Standing Expiration Date:   3/7/2025     Order Specific Question:   Additional Contrast?     Answer:   Radiologist Recommendation     Order Specific Question:   STAT Creatinine as needed:     Answer:   No     Order Specific Question:   Reason for exam:     Answer:   generalized abd pain greater than 3 months         Patient Instructions     SURVEY:    You may be receiving a survey from [employer] regarding your visit today.    Please complete the survey to enable us to provide the highest quality of care to you and your family.    If you cannot score us a very good on any question, please call the office to discuss how we could have made your experience a very good one.    Thank you.     Electronically signed by Nilo Aquino DNP,APRN,CNP  on 3/7/2024 at 3:28 PM           Completed Refills   Requested Prescriptions     Signed Prescriptions Disp Refills    benzoyl peroxide 5 % external liquid 118 mL 2     Sig: Apply topically 2 times daily Apply topically 2 times daily.

## 2024-03-11 RX ORDER — ONDANSETRON 4 MG/1
4 TABLET, ORALLY DISINTEGRATING ORAL EVERY 8 HOURS PRN
Qty: 90 TABLET | Refills: 1 | Status: SHIPPED | OUTPATIENT
Start: 2024-03-11

## 2024-03-11 NOTE — TELEPHONE ENCOUNTER
Last OV 12/21/23, 03/07/24 acute     Next OV not scheduled     Requesting refill on ondansetron thru Mychart.  Rx pending

## 2024-03-14 ENCOUNTER — HOSPITAL ENCOUNTER (OUTPATIENT)
Dept: CT IMAGING | Age: 24
Discharge: HOME | End: 2024-03-16
Payer: COMMERCIAL

## 2024-03-14 DIAGNOSIS — R10.84 GENERALIZED ABDOMINAL PAIN: ICD-10-CM

## 2024-03-14 PROCEDURE — 6360000004 HC RX CONTRAST MEDICATION: Performed by: NURSE PRACTITIONER

## 2024-03-14 PROCEDURE — 74177 CT ABD & PELVIS W/CONTRAST: CPT

## 2024-03-14 RX ADMIN — IOHEXOL 20 ML: 240 INJECTION, SOLUTION INTRATHECAL; INTRAVASCULAR; INTRAVENOUS; ORAL at 11:52

## 2024-03-14 RX ADMIN — IOPAMIDOL 75 ML: 755 INJECTION, SOLUTION INTRAVENOUS at 11:55

## 2024-03-21 RX ORDER — VENLAFAXINE 75 MG/1
150 TABLET ORAL DAILY
Qty: 60 TABLET | Refills: 2 | Status: SHIPPED | OUTPATIENT
Start: 2024-03-21

## 2024-03-25 ENCOUNTER — PATIENT MESSAGE (OUTPATIENT)
Dept: FAMILY MEDICINE CLINIC | Age: 24
End: 2024-03-25

## 2024-03-25 DIAGNOSIS — K21.9 GASTROESOPHAGEAL REFLUX DISEASE WITHOUT ESOPHAGITIS: ICD-10-CM

## 2024-03-25 DIAGNOSIS — R11.0 NAUSEA IN ADULT: ICD-10-CM

## 2024-03-25 DIAGNOSIS — K58.2 IRRITABLE BOWEL SYNDROME WITH BOTH CONSTIPATION AND DIARRHEA: ICD-10-CM

## 2024-03-25 DIAGNOSIS — R10.84 GENERALIZED ABDOMINAL PAIN: Primary | ICD-10-CM

## 2024-03-25 NOTE — TELEPHONE ENCOUNTER
From: Kathy Osorio  To: Nilo Aquino  Sent: 3/25/2024 2:56 PM EDT  Subject: Continuing stomach pain    Hello! I wanted to reach out in regards to my stomach pain. Since my CT scan, I have been taking mirlax and have went to the bathroom quite a few times since then. Probably still not as much as I should, but I have went and it was a decent amount. I still however am having stomach pains. A lot of it still is lots of bloating, paul crampy and lots of gas. I take pepto bismol sometimes, but the only thing that has been giving me any relief mainly is a heating pad. Do you have any thoughts on this or any ideas of what could help?     Thanks!  Kathy Osorio

## 2024-04-05 ENCOUNTER — OFFICE VISIT (OUTPATIENT)
Dept: GASTROENTEROLOGY | Age: 24
End: 2024-04-05
Payer: COMMERCIAL

## 2024-04-05 ENCOUNTER — HOSPITAL ENCOUNTER (OUTPATIENT)
Age: 24
Discharge: HOME | End: 2024-04-05
Payer: COMMERCIAL

## 2024-04-05 VITALS
BODY MASS INDEX: 22.5 KG/M2 | HEART RATE: 77 BPM | RESPIRATION RATE: 18 BRPM | SYSTOLIC BLOOD PRESSURE: 116 MMHG | DIASTOLIC BLOOD PRESSURE: 78 MMHG | OXYGEN SATURATION: 97 % | WEIGHT: 123 LBS

## 2024-04-05 DIAGNOSIS — R14.2 FLATULENCE, ERUCTATION AND GAS PAIN: ICD-10-CM

## 2024-04-05 DIAGNOSIS — R14.3 FLATULENCE, ERUCTATION AND GAS PAIN: ICD-10-CM

## 2024-04-05 DIAGNOSIS — K59.00 CONSTIPATION, UNSPECIFIED CONSTIPATION TYPE: Primary | ICD-10-CM

## 2024-04-05 DIAGNOSIS — L70.0 PUSTULAR ACNE: ICD-10-CM

## 2024-04-05 DIAGNOSIS — R14.1 FLATULENCE, ERUCTATION AND GAS PAIN: ICD-10-CM

## 2024-04-05 LAB
GLIADIN IGA SER IA-ACNC: NORMAL U/ML
GLIADIN IGG SER IA-ACNC: NORMAL U/ML
IGA SERPL-MCNC: 194 MG/DL (ref 70–400)
TTG IGA SER IA-ACNC: NORMAL U/ML

## 2024-04-05 PROCEDURE — 83516 IMMUNOASSAY NONANTIBODY: CPT

## 2024-04-05 PROCEDURE — 36415 COLL VENOUS BLD VENIPUNCTURE: CPT

## 2024-04-05 PROCEDURE — 87338 HPYLORI STOOL AG IA: CPT

## 2024-04-05 PROCEDURE — 83993 ASSAY FOR CALPROTECTIN FECAL: CPT

## 2024-04-05 PROCEDURE — 82784 ASSAY IGA/IGD/IGG/IGM EACH: CPT

## 2024-04-05 PROCEDURE — 99213 OFFICE O/P EST LOW 20 MIN: CPT | Performed by: NURSE PRACTITIONER

## 2024-04-05 NOTE — TELEPHONE ENCOUNTER
Patient is requesting refill on her benzoyl peroxide 5% via mychart  Patient was last seen 3-7-24  Pt currently does not have f/u

## 2024-04-05 NOTE — PROGRESS NOTES
[address] [id number]    Chief Complaint   Patient presents with    Abdominal Pain     Patient presents for abdominal pain. Patient states pain has been going on for a month or so in the lower quadrant of her stomach. Patient has been having on and off pain and states that it causes her to become constipated at times. Patient states it causes her to become nauseas but no vomiting. Patient does not take any medications for it at the moment.          ROBINSON Osorio is a 24 y.o. old female who has a past medical history of generalized anxiety disorder, panic disorder, insomnia presenting as new GI referral with chronic abdominal pain and constipation.  According to patient, she has been experiencing worsening abdominal bloating, cramping and gas with what she describes as \"bubbly gut\".  She has tried Gas-X which helps with the bloating but worsens her gas.  She endorses a history of constipation, reporting that she will use MiraLAX as needed with some improvements but constipation always returns.  She has not tried a daily MiraLAX.  She endorses a history of IBS that was diagnosed years ago when she began to experience postprandial abdominal cramping and diarrhea.  She reports that the symptoms have resolved.  She voices concern for gluten sensitivity, reporting that she was tested in the past which was negative.  She has tried cutting gluten from her diet for approximately 1 week with no resolution of symptoms.  She denies concerns for unusual rashes, skin discoloration or oral mucosa blisters.    Family history of upper GI cancer:  No  Family history of colon cancer: No  Blood in stool: No  Unintentional weight loss: No  Abdominal pain: Yes  Prior colonoscopy: No  Prior EGD: No  Constipation history: Yes  Number of bowel movements a day: 3/week  Change in stool caliber: No  History of GERD or Acid Reflux: Yes: \"a little bit\" feels it is well controlled with TUMS    CT abdomen pelvis 3/2024:

## 2024-04-06 LAB
MICROORGANISM/AGENT SPEC: NEGATIVE
SPECIMEN DESCRIPTION: NORMAL

## 2024-04-08 LAB
GLIADIN IGA SER IA-ACNC: 0.6 U/ML
GLIADIN IGG SER IA-ACNC: <0.4 U/ML
IGA SERPL-MCNC: 194 MG/DL (ref 70–400)
TTG IGA SER IA-ACNC: 0.3 U/ML

## 2024-04-09 ENCOUNTER — TELEPHONE (OUTPATIENT)
Dept: GASTROENTEROLOGY | Age: 24
End: 2024-04-09

## 2024-04-09 LAB — CALPROTECTIN, FECAL: 113 UG/G

## 2024-04-09 NOTE — TELEPHONE ENCOUNTER
----- Message from ALEXIS Sanchez - CNP sent at 4/9/2024  1:57 PM EDT -----  Please notify patient that her H. pylori test returned negative.  Celiac panel returned within normal limits.  Thanks, Keila.

## 2024-04-10 ENCOUNTER — TELEPHONE (OUTPATIENT)
Dept: GASTROENTEROLOGY | Age: 24
End: 2024-04-10

## 2024-04-10 DIAGNOSIS — R19.5 ELEVATED FECAL CALPROTECTIN: Primary | ICD-10-CM

## 2024-04-10 NOTE — TELEPHONE ENCOUNTER
LVM notifying patient of results and telling her to call back if she has any questions of concerns.

## 2024-04-10 NOTE — TELEPHONE ENCOUNTER
----- Message from ALEXIS Sanchez CNP sent at 4/10/2024  7:27 AM EDT -----  Borderline elevated fecal calprotectin, plan to repeat the week of May 13, 2024 and order is placed on her EMR.  My chart message was sent to Kathy.  Please call her and make sure she has seen it and understands.  Thanks, Keila.

## 2024-04-19 ENCOUNTER — OFFICE VISIT (OUTPATIENT)
Dept: FAMILY MEDICINE CLINIC | Age: 24
End: 2024-04-19
Payer: COMMERCIAL

## 2024-04-19 ENCOUNTER — HOSPITAL ENCOUNTER (OUTPATIENT)
Age: 24
Setting detail: SPECIMEN
Discharge: HOME | End: 2024-04-19
Payer: COMMERCIAL

## 2024-04-19 VITALS
SYSTOLIC BLOOD PRESSURE: 112 MMHG | OXYGEN SATURATION: 99 % | TEMPERATURE: 98.2 F | DIASTOLIC BLOOD PRESSURE: 72 MMHG | HEART RATE: 98 BPM

## 2024-04-19 DIAGNOSIS — N89.8 VAGINAL DISCHARGE: ICD-10-CM

## 2024-04-19 DIAGNOSIS — R10.30 LOWER ABDOMINAL PAIN: ICD-10-CM

## 2024-04-19 DIAGNOSIS — N89.8 VAGINAL DISCHARGE: Primary | ICD-10-CM

## 2024-04-19 LAB
BILIRUBIN, POC: NORMAL
BLOOD URINE, POC: NORMAL
CANDIDA SPECIES: NEGATIVE
CLARITY, POC: CLEAR
COLOR, POC: YELLOW
GARDNERELLA VAGINALIS: NEGATIVE
GLUCOSE URINE, POC: NORMAL
KETONES, POC: NORMAL
LEUKOCYTE EST, POC: NORMAL
NITRITE, POC: NORMAL
PH, POC: 8
PROTEIN, POC: NORMAL
SOURCE: NORMAL
SPECIFIC GRAVITY, POC: 1.02
TRICHOMONAS: NEGATIVE
UROBILINOGEN, POC: 0.2

## 2024-04-19 PROCEDURE — 81002 URINALYSIS NONAUTO W/O SCOPE: CPT | Performed by: NURSE PRACTITIONER

## 2024-04-19 PROCEDURE — 87510 GARDNER VAG DNA DIR PROBE: CPT

## 2024-04-19 PROCEDURE — 87480 CANDIDA DNA DIR PROBE: CPT

## 2024-04-19 PROCEDURE — 99213 OFFICE O/P EST LOW 20 MIN: CPT | Performed by: NURSE PRACTITIONER

## 2024-04-19 PROCEDURE — 87660 TRICHOMONAS VAGIN DIR PROBE: CPT

## 2024-04-19 ASSESSMENT — ENCOUNTER SYMPTOMS
BACK PAIN: 0
ABDOMINAL PAIN: 1

## 2024-04-19 NOTE — PROGRESS NOTES
01/10/2024 10:42 AM    BUN 10 01/10/2024 10:42 AM    CREATININE 0.8 01/10/2024 10:42 AM    GLUCOSE 90 01/10/2024 10:42 AM    PROT 7.2 01/10/2024 10:42 AM    BILITOT 0.4 01/10/2024 10:42 AM    ALKPHOS 42 01/10/2024 10:42 AM    AST 14 01/10/2024 10:42 AM    ALT 15 01/10/2024 10:42 AM     Lab Results   Component Value Date/Time    WBC 7.5 01/10/2024 10:42 AM    RBC 4.73 01/10/2024 10:42 AM    HGB 14.6 01/10/2024 10:42 AM    HCT 41.6 01/10/2024 10:42 AM    MCV 87.9 01/10/2024 10:42 AM    MCH 30.9 01/10/2024 10:42 AM    MCHC 35.1 01/10/2024 10:42 AM    RDW 11.4 01/10/2024 10:42 AM     01/10/2024 10:42 AM    MPV 10.2 01/10/2024 10:42 AM     Lab Results   Component Value Date/Time    TSH 2.23 12/02/2022 10:18 AM     No results found for: \"CHOL\", \"LDL\", \"HDL\", \"PSA\", \"LABA1C\"       Assessment & Plan        Diagnosis Orders   1. Vaginal discharge  Vaginitis DNA Probe      2. Lower abdominal pain  POCT Urinalysis no Micro          Will have pt give urine for UA. Will have pt do Vnap. Pt advised to urinate after sex and/or shower after sex.             Completed Refills   Requested Prescriptions      No prescriptions requested or ordered in this encounter     No follow-ups on file.  No orders of the defined types were placed in this encounter.    Orders Placed This Encounter   Procedures    Vaginitis DNA Probe     Standing Status:   Future     Standing Expiration Date:   4/19/2025    POCT Urinalysis no Micro         Patient Instructions     SURVEY:    You may be receiving a survey from [employer] regarding your visit today.    Please complete the survey to enable us to provide the highest quality of care to you and your family.    If you cannot score us a very good on any question, please call the office to discuss how we could have made your experience a very good one.    Thank you.     Electronically signed by Nilo Aquino DNP,APRN,CNP  on 4/19/2024 at 2:05 PM           Completed Refills   Requested

## 2024-04-22 DIAGNOSIS — K62.5 RECTAL BLEEDING: Primary | ICD-10-CM

## 2024-04-22 DIAGNOSIS — K62.5 RECTAL BLEEDING: ICD-10-CM

## 2024-04-22 DIAGNOSIS — R19.5 ELEVATED FECAL CALPROTECTIN: Primary | ICD-10-CM

## 2024-04-26 ENCOUNTER — HOSPITAL ENCOUNTER (OUTPATIENT)
Age: 24
Discharge: HOME | End: 2024-04-26
Payer: COMMERCIAL

## 2024-04-26 DIAGNOSIS — R19.5 ELEVATED FECAL CALPROTECTIN: ICD-10-CM

## 2024-04-26 DIAGNOSIS — K62.5 RECTAL BLEEDING: ICD-10-CM

## 2024-04-26 LAB
BASOPHILS # BLD: 0.02 K/UL (ref 0–0.2)
BASOPHILS NFR BLD: 0 % (ref 0–2)
CRP SERPL HS-MCNC: 3.9 MG/L (ref 0–5)
EOSINOPHIL # BLD: 0.11 K/UL (ref 0–0.4)
EOSINOPHILS RELATIVE PERCENT: 2 % (ref 0–5)
ERYTHROCYTE [DISTWIDTH] IN BLOOD BY AUTOMATED COUNT: 11.2 % (ref 12.1–15.2)
ERYTHROCYTE [SEDIMENTATION RATE] IN BLOOD BY PHOTOMETRIC METHOD: <1 MM/HR (ref 0–20)
HCT VFR BLD AUTO: 39.6 % (ref 36–46)
HGB BLD-MCNC: 13.9 G/DL (ref 12–16)
IMM GRANULOCYTES # BLD AUTO: 0.01 K/UL (ref 0–0.3)
IMM GRANULOCYTES NFR BLD: 0 % (ref 0–5)
LYMPHOCYTES NFR BLD: 1.85 K/UL (ref 1–4.8)
LYMPHOCYTES RELATIVE PERCENT: 29 % (ref 15–40)
MCH RBC QN AUTO: 30.8 PG (ref 26–34)
MCHC RBC AUTO-ENTMCNC: 35.1 G/DL (ref 31–37)
MCV RBC AUTO: 87.8 FL (ref 80–100)
MONOCYTES NFR BLD: 0.61 K/UL (ref 0–1)
MONOCYTES NFR BLD: 10 % (ref 4–8)
NEUTROPHILS NFR BLD: 59 % (ref 47–75)
NEUTS SEG NFR BLD: 3.76 K/UL (ref 2.5–7)
PLATELET # BLD AUTO: 282 K/UL (ref 140–450)
PMV BLD AUTO: 10 FL (ref 6–12)
RBC # BLD AUTO: 4.51 M/UL (ref 4–5.2)
WBC OTHER # BLD: 6.4 K/UL (ref 3.5–11)

## 2024-04-26 PROCEDURE — 36415 COLL VENOUS BLD VENIPUNCTURE: CPT

## 2024-04-26 PROCEDURE — 86140 C-REACTIVE PROTEIN: CPT

## 2024-04-26 PROCEDURE — 83993 ASSAY FOR CALPROTECTIN FECAL: CPT

## 2024-04-26 PROCEDURE — 85652 RBC SED RATE AUTOMATED: CPT

## 2024-04-26 PROCEDURE — 85025 COMPLETE CBC W/AUTO DIFF WBC: CPT

## 2024-04-29 LAB — CALPROTECTIN, FECAL: 179 UG/G

## 2024-04-30 ENCOUNTER — TELEPHONE (OUTPATIENT)
Dept: GASTROENTEROLOGY | Age: 24
End: 2024-04-30

## 2024-04-30 DIAGNOSIS — R19.5 ELEVATED FECAL CALPROTECTIN: Primary | ICD-10-CM

## 2024-04-30 NOTE — TELEPHONE ENCOUNTER
Called patient LVM also sent a message in my chart providing scheduling number to schedule MRI ordered.

## 2024-04-30 NOTE — TELEPHONE ENCOUNTER
----- Message from ALEXIS Sanchez - CNP sent at 4/30/2024  8:50 AM EDT -----  Please let Kathy know her Calprotectin is elevated.  The fecal calprotectin is sensitive for colon inflammation and I would recommend MRI enterography for further evaluation, order is placed on her EMR.  Thanks, Keila

## 2024-05-13 RX ORDER — VENLAFAXINE 75 MG/1
150 TABLET ORAL DAILY
Qty: 60 TABLET | Refills: 2 | Status: SHIPPED | OUTPATIENT
Start: 2024-05-13

## 2024-05-13 NOTE — TELEPHONE ENCOUNTER
Last OV 4/19/24    Next OV not scheduled    Requesting refill on venlafaxine thru Mychart.  Rx pending

## 2024-05-15 NOTE — TELEPHONE ENCOUNTER
Last OV 4/19/24     Next OV not scheduled    Requesting refill on ondansetron thru Mychart.  Rx pending

## 2024-05-16 RX ORDER — ONDANSETRON 4 MG/1
4 TABLET, ORALLY DISINTEGRATING ORAL EVERY 8 HOURS PRN
Qty: 90 TABLET | Refills: 1 | Status: SHIPPED | OUTPATIENT
Start: 2024-05-16

## 2024-05-24 ENCOUNTER — HOSPITAL ENCOUNTER (OUTPATIENT)
Dept: MRI IMAGING | Age: 24
Discharge: HOME | End: 2024-05-24
Payer: COMMERCIAL

## 2024-05-24 DIAGNOSIS — R19.5 ELEVATED FECAL CALPROTECTIN: ICD-10-CM

## 2024-05-24 PROCEDURE — 6360000002 HC RX W HCPCS: Performed by: NURSE PRACTITIONER

## 2024-05-24 PROCEDURE — A9579 GAD-BASE MR CONTRAST NOS,1ML: HCPCS | Performed by: NURSE PRACTITIONER

## 2024-05-24 PROCEDURE — 2500000003 HC RX 250 WO HCPCS: Performed by: NURSE PRACTITIONER

## 2024-05-24 PROCEDURE — 96375 TX/PRO/DX INJ NEW DRUG ADDON: CPT

## 2024-05-24 PROCEDURE — 72197 MRI PELVIS W/O & W/DYE: CPT

## 2024-05-24 PROCEDURE — 6360000004 HC RX CONTRAST MEDICATION: Performed by: NURSE PRACTITIONER

## 2024-05-24 PROCEDURE — 96374 THER/PROPH/DIAG INJ IV PUSH: CPT

## 2024-05-24 RX ORDER — GLUCAGON 1 MG/ML
1 KIT INJECTION ONCE
Status: COMPLETED | OUTPATIENT
Start: 2024-05-24 | End: 2024-05-24

## 2024-05-24 RX ORDER — ONDANSETRON 2 MG/ML
4 INJECTION INTRAMUSCULAR; INTRAVENOUS ONCE
Status: COMPLETED | OUTPATIENT
Start: 2024-05-24 | End: 2024-05-24

## 2024-05-24 RX ADMIN — GADOTERIDOL 11 ML: 279.3 INJECTION, SOLUTION INTRAVENOUS at 11:11

## 2024-05-24 RX ADMIN — ONDANSETRON 4 MG: 2 INJECTION INTRAMUSCULAR; INTRAVENOUS at 11:23

## 2024-05-24 RX ADMIN — BARIUM SULFATE 900 ML: 1 SUSPENSION ORAL at 11:09

## 2024-05-24 RX ADMIN — GLUCAGON 1 MG: 1 INJECTION, POWDER, LYOPHILIZED, FOR SOLUTION INTRAMUSCULAR; INTRAVENOUS at 11:24

## 2024-05-29 ENCOUNTER — TELEPHONE (OUTPATIENT)
Dept: GASTROENTEROLOGY | Age: 24
End: 2024-05-29

## 2024-05-29 NOTE — TELEPHONE ENCOUNTER
----- Message from ALEXIS Sanchez - CNP sent at 5/28/2024  5:37 PM EDT -----  Please notify patient that her MRI enterography returned no significant abnormalities identified in the GI tract.  Thanks, Keila.

## 2024-06-01 ENCOUNTER — PATIENT MESSAGE (OUTPATIENT)
Dept: FAMILY MEDICINE CLINIC | Age: 24
End: 2024-06-01

## 2024-06-03 RX ORDER — NORETHINDRONE ACETATE AND ETHINYL ESTRADIOL, ETHINYL ESTRADIOL AND FERROUS FUMARATE 1MG-10(24)
1 KIT ORAL DAILY
Qty: 1 PACKET | Refills: 5 | Status: SHIPPED | OUTPATIENT
Start: 2024-06-03

## 2024-06-03 RX ORDER — ONDANSETRON 4 MG/1
4 TABLET, ORALLY DISINTEGRATING ORAL EVERY 8 HOURS PRN
Qty: 90 TABLET | Refills: 1 | Status: SHIPPED | OUTPATIENT
Start: 2024-06-03

## 2024-06-03 NOTE — TELEPHONE ENCOUNTER
From: Kathy Osorio  To: Nilo Aquino  Sent: 6/1/2024 1:33 PM EDT  Subject: Prescriptions     Hello! I was wondering if I could get a refill on my zofran as well as birth control. It won’t let me request a refill through the ricky. We had some issues with our insurance but I believe it should be fine now.     Also, I got my results back from my scans that I had done for my stomach issues. Nothing was detected on the MRI. I was wondering if I could try the stomach medicine we had me on for IBS awhile back as my symptoms are starting to feel very similiar to when I was dealing with it in the past.    Thanks!  Kathy Osorio

## 2024-06-03 NOTE — TELEPHONE ENCOUNTER
Last OV: 4/19/2024 01/19/24 nausea   Last RX:    Next scheduled apt: Visit date not found         Pt requesting a refill

## 2024-07-01 DIAGNOSIS — L70.0 PUSTULAR ACNE: ICD-10-CM

## 2024-07-01 RX ORDER — VENLAFAXINE 75 MG/1
150 TABLET ORAL DAILY
Qty: 60 TABLET | Refills: 2 | Status: SHIPPED | OUTPATIENT
Start: 2024-07-01

## 2024-07-01 NOTE — TELEPHONE ENCOUNTER
Last OV: 4/19/2024  MAVIS    Last RX:    Next scheduled apt: Visit date not found             Pt requesting a refill

## 2024-07-02 RX ORDER — NORETHINDRONE ACETATE AND ETHINYL ESTRADIOL, ETHINYL ESTRADIOL AND FERROUS FUMARATE 1MG-10(24)
1 KIT ORAL DAILY
Qty: 1 PACKET | Refills: 5 | OUTPATIENT
Start: 2024-07-02

## 2024-07-16 ENCOUNTER — OFFICE VISIT (OUTPATIENT)
Dept: FAMILY MEDICINE CLINIC | Age: 24
End: 2024-07-16
Payer: COMMERCIAL

## 2024-07-16 VITALS
WEIGHT: 124 LBS | DIASTOLIC BLOOD PRESSURE: 68 MMHG | OXYGEN SATURATION: 97 % | HEIGHT: 62 IN | HEART RATE: 75 BPM | SYSTOLIC BLOOD PRESSURE: 102 MMHG | BODY MASS INDEX: 22.82 KG/M2

## 2024-07-16 DIAGNOSIS — N94.6 DYSMENORRHEA: Primary | ICD-10-CM

## 2024-07-16 PROBLEM — S86.891A SHIN SPLINT OF RIGHT LOWER EXTREMITY: Status: RESOLVED | Noted: 2022-11-07 | Resolved: 2024-07-16

## 2024-07-16 PROCEDURE — 99213 OFFICE O/P EST LOW 20 MIN: CPT | Performed by: NURSE PRACTITIONER

## 2024-07-16 RX ORDER — NORGESTREL AND ETHINYL ESTRADIOL 0.3-0.03MG
1 KIT ORAL DAILY
Qty: 56 TABLET | Refills: 5 | Status: SHIPPED | OUTPATIENT
Start: 2024-07-16

## 2024-07-16 ASSESSMENT — ENCOUNTER SYMPTOMS
SHORTNESS OF BREATH: 0
COUGH: 0
VOMITING: 0
NAUSEA: 0
DIARRHEA: 0

## 2024-07-16 NOTE — PROGRESS NOTES
GLUCOSE 90 01/10/2024 10:42 AM    BILITOT 0.4 01/10/2024 10:42 AM    ALKPHOS 42 01/10/2024 10:42 AM    AST 14 01/10/2024 10:42 AM    ALT 15 01/10/2024 10:42 AM     Lab Results   Component Value Date/Time    WBC 6.4 04/26/2024 12:17 PM    RBC 4.51 04/26/2024 12:17 PM    HGB 13.9 04/26/2024 12:17 PM    HCT 39.6 04/26/2024 12:17 PM    MCV 87.8 04/26/2024 12:17 PM    MCH 30.8 04/26/2024 12:17 PM    MCHC 35.1 04/26/2024 12:17 PM    RDW 11.2 04/26/2024 12:17 PM     04/26/2024 12:17 PM    MPV 10.0 04/26/2024 12:17 PM     Lab Results   Component Value Date/Time    TSH 2.23 12/02/2022 10:18 AM     No results found for: \"CHOL\", \"LDL\", \"HDL\", \"PSA\", \"LABA1C\"       Assessment & Plan        Diagnosis Orders   1. Dysmenorrhea          Will change back to Elinest. Pt educated about risk factor of blood clots regarding birth control and smoking/vaping.               Completed Refills   Requested Prescriptions     Signed Prescriptions Disp Refills    norgestrel-ethinyl estradiol (ELINEST) 0.3-30 MG-MCG per tablet 56 tablet 5     Sig: Take 1 tablet by mouth daily     No follow-ups on file.  Orders Placed This Encounter   Medications    norgestrel-ethinyl estradiol (ELINEST) 0.3-30 MG-MCG per tablet     Sig: Take 1 tablet by mouth daily     Dispense:  56 tablet     Refill:  5     No orders of the defined types were placed in this encounter.        Patient Instructions     SURVEY:    You may be receiving a survey from [facility] regarding your visit today.    Please complete the survey to enable us to provide the highest quality of care to you and your family.    If you cannot score us a very good on any question, please call the office to discuss how we could have made your experience a very good one.    Thank you.     Electronically signed by Nilo Auqino DNP,APRN,CNP  on 7/16/2024 at 10:25 AM           Completed Refills   Requested Prescriptions     Signed Prescriptions Disp Refills    norgestrel-ethinyl estradiol

## 2024-08-12 RX ORDER — ONDANSETRON 4 MG/1
4 TABLET, ORALLY DISINTEGRATING ORAL EVERY 8 HOURS PRN
Qty: 90 TABLET | Refills: 1 | Status: SHIPPED | OUTPATIENT
Start: 2024-08-12

## 2024-09-05 RX ORDER — NORGESTREL AND ETHINYL ESTRADIOL 0.3-0.03MG
1 KIT ORAL DAILY
Qty: 56 TABLET | Refills: 5 | Status: SHIPPED | OUTPATIENT
Start: 2024-09-05

## 2024-09-10 RX ORDER — VENLAFAXINE 75 MG/1
150 TABLET ORAL DAILY
Qty: 60 TABLET | Refills: 2 | Status: SHIPPED | OUTPATIENT
Start: 2024-09-10

## 2024-09-11 ENCOUNTER — TELEPHONE (OUTPATIENT)
Dept: GASTROENTEROLOGY | Age: 24
End: 2024-09-11

## 2024-09-11 ENCOUNTER — PATIENT MESSAGE (OUTPATIENT)
Dept: FAMILY MEDICINE CLINIC | Age: 24
End: 2024-09-11

## 2024-09-16 DIAGNOSIS — R19.5 ELEVATED FECAL CALPROTECTIN: Primary | ICD-10-CM

## 2024-09-17 ENCOUNTER — HOSPITAL ENCOUNTER (OUTPATIENT)
Age: 24
Setting detail: SPECIMEN
Discharge: HOME | End: 2024-09-17
Payer: COMMERCIAL

## 2024-09-17 DIAGNOSIS — R19.5 ELEVATED FECAL CALPROTECTIN: ICD-10-CM

## 2024-09-17 PROCEDURE — 83993 ASSAY FOR CALPROTECTIN FECAL: CPT

## 2024-09-19 LAB — CALPROTECTIN, FECAL: 113 UG/G

## 2024-09-24 ENCOUNTER — OFFICE VISIT (OUTPATIENT)
Dept: FAMILY MEDICINE CLINIC | Age: 24
End: 2024-09-24
Payer: COMMERCIAL

## 2024-09-24 VITALS
SYSTOLIC BLOOD PRESSURE: 108 MMHG | OXYGEN SATURATION: 98 % | TEMPERATURE: 98.3 F | HEART RATE: 107 BPM | DIASTOLIC BLOOD PRESSURE: 70 MMHG

## 2024-09-24 DIAGNOSIS — J06.9 VIRAL URI: Primary | ICD-10-CM

## 2024-09-24 DIAGNOSIS — R09.82 PND (POST-NASAL DRIP): ICD-10-CM

## 2024-09-24 PROCEDURE — 87880 STREP A ASSAY W/OPTIC: CPT | Performed by: NURSE PRACTITIONER

## 2024-09-24 PROCEDURE — 99213 OFFICE O/P EST LOW 20 MIN: CPT | Performed by: NURSE PRACTITIONER

## 2024-09-24 ASSESSMENT — ENCOUNTER SYMPTOMS
DIARRHEA: 0
VOMITING: 0
COUGH: 0
RHINORRHEA: 1
SHORTNESS OF BREATH: 0
SORE THROAT: 1
NAUSEA: 0

## 2024-09-27 ENCOUNTER — TELEPHONE (OUTPATIENT)
Dept: GASTROENTEROLOGY | Age: 24
End: 2024-09-27

## 2024-09-30 ENCOUNTER — HOSPITAL ENCOUNTER (OUTPATIENT)
Age: 24
Setting detail: SPECIMEN
Discharge: HOME | End: 2024-09-30
Payer: COMMERCIAL

## 2024-09-30 ENCOUNTER — OFFICE VISIT (OUTPATIENT)
Dept: FAMILY MEDICINE CLINIC | Age: 24
End: 2024-09-30

## 2024-09-30 DIAGNOSIS — Z72.51 SEXUALLY ACTIVE AT YOUNG AGE: ICD-10-CM

## 2024-09-30 DIAGNOSIS — R30.0 DYSURIA: ICD-10-CM

## 2024-09-30 DIAGNOSIS — Z01.419 WELL WOMAN EXAM WITH ROUTINE GYNECOLOGICAL EXAM: ICD-10-CM

## 2024-09-30 DIAGNOSIS — Z01.419 WELL WOMAN EXAM WITH ROUTINE GYNECOLOGICAL EXAM: Primary | ICD-10-CM

## 2024-09-30 DIAGNOSIS — N94.6 DYSMENORRHEA: ICD-10-CM

## 2024-09-30 DIAGNOSIS — Z20.2 STD EXPOSURE: ICD-10-CM

## 2024-09-30 DIAGNOSIS — N89.8 VAGINAL DISCHARGE: ICD-10-CM

## 2024-09-30 LAB
BILIRUBIN, POC: NEGATIVE
BLOOD URINE, POC: NEGATIVE
CLARITY, POC: NORMAL
COLOR, POC: YELLOW
CONTROL: PRESENT
GLUCOSE URINE, POC: NEGATIVE MG/DL
KETONES, POC: NEGATIVE MG/DL
LEUKOCYTE EST, POC: NORMAL
NITRITE, POC: NEGATIVE
PH, POC: 7.5
PREGNANCY TEST URINE, POC: NEGATIVE
PROTEIN, POC: NEGATIVE MG/DL
SPECIFIC GRAVITY, POC: 1.02
UROBILINOGEN, POC: 0.2 MG/DL

## 2024-09-30 PROCEDURE — 87070 CULTURE OTHR SPECIMN AEROBIC: CPT

## 2024-09-30 PROCEDURE — 87591 N.GONORRHOEAE DNA AMP PROB: CPT

## 2024-09-30 PROCEDURE — 87624 HPV HI-RISK TYP POOLED RSLT: CPT

## 2024-09-30 PROCEDURE — G0145 SCR C/V CYTO,THINLAYER,RESCR: HCPCS

## 2024-09-30 PROCEDURE — 87086 URINE CULTURE/COLONY COUNT: CPT

## 2024-09-30 PROCEDURE — 87491 CHLMYD TRACH DNA AMP PROBE: CPT

## 2024-09-30 RX ORDER — AZITHROMYCIN 500 MG/1
1000 TABLET, FILM COATED ORAL DAILY
Qty: 1 PACKET | Refills: 0 | Status: SHIPPED | OUTPATIENT
Start: 2024-09-30 | End: 2024-10-01

## 2024-09-30 NOTE — PATIENT INSTRUCTIONS
"Anesthesia Transfer of Care Note    Patient: Parisa Dimas    Procedure(s) Performed: Procedure(s) (LRB):  COLONOSCOPY (N/A)    Patient location: GI    Anesthesia Type: general    Transport from OR: Transported from OR on room air with adequate spontaneous ventilation    Post pain: adequate analgesia    Post assessment: no apparent anesthetic complications and tolerated procedure well    Post vital signs: stable    Level of consciousness: awake    Nausea/Vomiting: no nausea/vomiting    Complications: none    Transfer of care protocol was followed      Last vitals: Visit Vitals  BP (!) 154/75 (BP Location: Left arm, Patient Position: Lying)   Pulse 74   Temp 36.2 °C (97.2 °F) (Temporal)   Resp 18   Ht 5' 3" (1.6 m)   Wt 88 kg (194 lb)   LMP 08/01/2003   SpO2 98%   Breastfeeding No   BMI 34.37 kg/m²     " THANK YOU FOR TRUSTING US WITH YOUR HEALTHCARE !!    The associates caring for you today were:    Family Practice Provider: Paula ESPINOZA-TRINITY    Clinical Team Nurse: Nuha Mcdaniels LPN    Clinical Team Medical Assistant: Hannah Lopez MA    Our goal is to provide you with EXCEPTIONAL patient care, and we strive to do this for EVERY patient, EVERY visit. Since we care about you and your experience, you may receive a patient satisfaction survey from [employer] by postal mail/email/text. We truly welcome your feedback and ask that you complete the survey to let us know how we are doing.    Important Numbers:  [facility] phone [phone]   [facility] Nurse/MA Line: [phone]  Fax  [phone]   [facility] [phone]  Billing questions: [phone]  Medical Records Request: [phone]    Manage your healthcare  with [facility] My[facility]. To activate your account, visit [email or web address]   DIGITAL scheduling available now through my chart.  Schedule your next appointment at your convenience through your my chart.       [facility] Office Hours:  Monday: [place] office location 8-5 ([phone]) Offering additional late hours the first Monday of the month until 7 pm.   Tuesday: 8: :  812 Noon, closed  of the month   : 7:30-4:30   SURVEY:    You may be receiving a survey from [employer] regarding your visit today.    Please complete the survey to enable us to provide the highest quality of care to you and your family.    If you cannot score us a very good on any question, please call the office to discuss how we could have made your experience a very good one.    Thank you.

## 2024-09-30 NOTE — PROGRESS NOTES
[facility]  [facility] [place]  [address] [place] [address]  Dept: [phone]  Dept Fax: [phone]    Last encounter 2024    Exposure to STD (Pt states after ending her last relationship approx 1 month ago, she found out her ex had been with another woman who tested + for chlamydia )       HPI:   Kathy Osorio is a 24 y.o. female who presentstoday for her medical conditions/complaints as noted below.  Kathy Osorio is c/o of Exposure to STD (Pt states after ending her last relationship approx 1 month ago, she found out her ex had been with another woman who tested + for chlamydia )      HPI  Established patient with partner provider.     Pt here for FIRST pap test, never had one prior,   Feels she has been exposed to chlamydia from her last partner , who found out another woman he had relations with has chlamydia.   Here today for check     Menses onset age 16 y.o   Uses both tampons and pads.  Periods 4 days , 1 heavy day first day.   A0  Sexually active: male partner.  Lifetime partners : 3   LMP 2024  On birth control since age 17 -heavy painful periods. A lot more cramps   Ibuprofen taken prior to onset.   Has some headaches: migraines cramps nausea.     No family hx blood clots.   Normally has partner use condoms , missed one time.     Checked for STD in past once.   Birth control prescribed by PCP     Reviewed prior notes  current PCP  Reviewed previous Labs, Imaging, and Hospital Records    History reviewed. No pertinent past medical history.   Past Surgical History:   Procedure Laterality Date   • HERNIA REPAIR     • MYRINGOTOMY AND TYMPANOSTOMY TUBE PLACEMENT         History reviewed. No pertinent family history.    Social History     Tobacco Use   • Smoking status: Never   • Smokeless tobacco: Never   Substance Use Topics   • Alcohol use: Not Currently      Current Outpatient Medications   Medication Sig Dispense Refill   • azithromycin (ZITHROMAX) 500 MG

## 2024-10-01 VITALS
SYSTOLIC BLOOD PRESSURE: 118 MMHG | WEIGHT: 131 LBS | HEIGHT: 62 IN | BODY MASS INDEX: 24.11 KG/M2 | OXYGEN SATURATION: 98 % | DIASTOLIC BLOOD PRESSURE: 70 MMHG | HEART RATE: 88 BPM

## 2024-10-01 LAB
HPV I/H RISK 4 DNA CVX QL NAA+PROBE: DETECTED
HPV SAMPLE: ABNORMAL
HPV, INTERPRETATION: ABNORMAL
HPV16 DNA CVX QL NAA+PROBE: NOT DETECTED
HPV18 DNA CVX QL NAA+PROBE: NOT DETECTED
MICROORGANISM SPEC CULT: NORMAL
SERVICE CMNT-IMP: NORMAL
SPECIMEN DESCRIPTION: ABNORMAL
SPECIMEN DESCRIPTION: NORMAL

## 2024-10-01 ASSESSMENT — ENCOUNTER SYMPTOMS
EYES NEGATIVE: 1
WHEEZING: 0
SORE THROAT: 0
CHEST TIGHTNESS: 0
COUGH: 0
SHORTNESS OF BREATH: 0

## 2024-10-02 LAB
C TRACH DNA SPEC QL PROBE+SIG AMP: NEGATIVE
N GONORRHOEA DNA SPEC QL PROBE+SIG AMP: NEGATIVE
SPECIMEN DESCRIPTION: NORMAL

## 2024-10-03 LAB
MICROORGANISM SPEC CULT: NORMAL
SERVICE CMNT-IMP: NORMAL
SPECIMEN DESCRIPTION: NORMAL

## 2024-10-08 ENCOUNTER — PATIENT MESSAGE (OUTPATIENT)
Dept: FAMILY MEDICINE CLINIC | Age: 24
End: 2024-10-08

## 2024-10-08 LAB — CYTOLOGY REPORT: NORMAL

## 2024-10-08 RX ORDER — METRONIDAZOLE 500 MG/1
500 TABLET ORAL 2 TIMES DAILY
Qty: 14 TABLET | Refills: 0 | Status: SHIPPED | OUTPATIENT
Start: 2024-10-08 | End: 2024-10-15

## 2024-10-14 ENCOUNTER — TELEPHONE (OUTPATIENT)
Dept: SURGERY | Age: 24
End: 2024-10-14

## 2024-10-14 ENCOUNTER — OFFICE VISIT (OUTPATIENT)
Dept: GASTROENTEROLOGY | Age: 24
End: 2024-10-14
Payer: COMMERCIAL

## 2024-10-14 VITALS
DIASTOLIC BLOOD PRESSURE: 78 MMHG | BODY MASS INDEX: 24.33 KG/M2 | SYSTOLIC BLOOD PRESSURE: 126 MMHG | HEART RATE: 73 BPM | OXYGEN SATURATION: 96 % | WEIGHT: 133 LBS | RESPIRATION RATE: 18 BRPM

## 2024-10-14 DIAGNOSIS — G89.29 CHRONIC ABDOMINAL PAIN: ICD-10-CM

## 2024-10-14 DIAGNOSIS — R10.9 CHRONIC ABDOMINAL PAIN: ICD-10-CM

## 2024-10-14 DIAGNOSIS — R19.5 ELEVATED FECAL CALPROTECTIN: Primary | ICD-10-CM

## 2024-10-14 PROCEDURE — 99213 OFFICE O/P EST LOW 20 MIN: CPT | Performed by: NURSE PRACTITIONER

## 2024-10-14 ASSESSMENT — ENCOUNTER SYMPTOMS
ABDOMINAL PAIN: 1
ALLERGIC/IMMUNOLOGIC NEGATIVE: 1
CONSTIPATION: 1
RESPIRATORY NEGATIVE: 1

## 2024-10-14 NOTE — PROGRESS NOTES
non-tender, non-distended, normal bowel sounds.  Skin:  Normal turgor.  Warm, dry, without visible rash, unless noted elsewhere.  Extremities: no lower extremity edema.  Musculoskeletal: normal range of motion, no joint swelling.  Neurologic:  no cranial nerve deficit, gait, coordination and speech normal.      Lab Results   Component Value Date    WBC 6.4 04/26/2024    HGB 13.9 04/26/2024    HCT 39.6 04/26/2024    MCV 87.8 04/26/2024     04/26/2024        Lab Results   Component Value Date     01/10/2024    K 4.3 01/10/2024     01/10/2024    CO2 23 01/10/2024    BUN 10 01/10/2024    CREATININE 0.8 01/10/2024    GLUCOSE 90 01/10/2024    CALCIUM 9.4 01/10/2024    BILITOT 0.4 01/10/2024    ALKPHOS 42 01/10/2024    AST 14 01/10/2024    ALT 15 01/10/2024    LABGLOM >60 01/10/2024    GFRAA >60 09/24/2022     CT Result (most recent):  CT ABDOMEN PELVIS W IV CONTRAST 03/14/2024    Narrative  EXAMINATION: CT ABDOMEN PELVIS W IV CONTRAST, 3/14/2024 11:52 AM EDT    HISTORY: Generalized abdominal pain    COMPARISON: 9/24/2022 CT abdomen pelvis.    TECHNIQUE: CT scan of the abdomen and pelvis was performed with IV contrast. CT  dose reduction technique was used, including Automated Exposure Control.    POSITIVES related to history: Moderate stool throughout colon.    NEGATIVES related to history: No mass or acute surgical change. No ovarian cyst  or mass.    COINCIDENTAL, unrelated to history: Normal appendix. Physiologic dominant  follicle right ovary 1.7 cm.    ROUTINE: Normal lung bases, female pelvis, kidneys, adrenal glands, pancreas,  spleen, gallbladder, liver and bone windows.    Impression  Moderate stool in the colon.      MRI Enterography 5/24/2024:  FINDINGS:  Stomach is unremarkable.  Small bowel loops show no abnormal thickening or  distension.  No strictures.  No acute inflammatory changes.  No abnormal  enhancement.  The terminal ileum is unremarkable.  Evaluation of the colon  shows no

## 2024-10-14 NOTE — TELEPHONE ENCOUNTER
Kathy Osorio     2000        female    [address] [place] [address]                    Legal Guardian no  If yes, Name:       Skilled Facility no     If yes, Name:                                             Home Phone: [phone]         Cell Phone:    Telephone Information:   Mobile [phone]                                           Surgeon: Miguelito   Surgery Date: 11/27/24                      Time: tbd    Procedure: colonoscopy   Duration:    Diagnosis: elevated fecal calprotectin R19.5  CPT Codes:57611    Important Medical History:  In Epic    First Assistant no  Special Inst/Equip/Implants: Regular    Nickel allergy  no  Latex Allergy: no      Cardiac Device:  No  If yes, need most recent pacemaker interrogation from Cardiologist:  Type of pacemaker:    Anesthesia:    MAC                       Admission Type:  Same Day                        Admit Prior to Day of Surgery: No    Case Location:  Ambulatory            Preadmission Testing:  Phone Call             PAT Date and Time: tbd    Need Preop Cardiac Clearance: no  Need Pre-op/Medical Clearance: no    Does Patient have Cardiologist/physician? Name of Physician:        Special Needs Communication:  Prakash Lift no    needed no

## 2024-10-16 RX ORDER — VENLAFAXINE 75 MG/1
150 TABLET ORAL DAILY
Qty: 60 TABLET | Refills: 2 | Status: SHIPPED | OUTPATIENT
Start: 2024-10-16

## 2024-10-16 NOTE — TELEPHONE ENCOUNTER
Rx refill request via [email or web address]  Venlafaxine 75mg 2 tabs qd  Last OV 9-30-24 for well woman  Next appt none

## 2024-11-07 RX ORDER — NORGESTREL AND ETHINYL ESTRADIOL 0.3-0.03MG
1 KIT ORAL DAILY
Qty: 84 TABLET | Refills: 5 | Status: SHIPPED | OUTPATIENT
Start: 2024-11-07

## 2024-11-07 RX ORDER — NORGESTREL AND ETHINYL ESTRADIOL 0.3-0.03MG
1 KIT ORAL DAILY
Qty: 56 TABLET | Refills: 5 | Status: SHIPPED | OUTPATIENT
Start: 2024-11-07 | End: 2024-11-07 | Stop reason: SDUPTHER

## 2024-11-25 RX ORDER — VENLAFAXINE 75 MG/1
150 TABLET ORAL DAILY
Qty: 60 TABLET | Refills: 2 | OUTPATIENT
Start: 2024-11-25

## 2024-11-25 RX ORDER — ONDANSETRON 4 MG/1
4 TABLET, ORALLY DISINTEGRATING ORAL EVERY 8 HOURS PRN
Qty: 90 TABLET | Refills: 1 | Status: SHIPPED | OUTPATIENT
Start: 2024-11-25

## 2024-11-25 RX ORDER — VENLAFAXINE 75 MG/1
150 TABLET ORAL DAILY
Qty: 60 TABLET | Refills: 2 | Status: SHIPPED | OUTPATIENT
Start: 2024-11-25

## 2024-11-25 NOTE — TELEPHONE ENCOUNTER
Last visit:  9/30/2024  Next Visit Date:  No future appointments.      Medication List:  Prior to Admission medications    Medication Sig Start Date End Date Taking? Authorizing Provider   ondansetron (ZOFRAN-ODT) 4 MG disintegrating tablet Take 1 tablet by mouth every 8 hours as needed for Nausea or Vomiting 11/25/24   Alejandro Nieto, DO   norgestrel-ethinyl estradiol (ELINEST) 0.3-30 MG-MCG per tablet Take 1 tablet by mouth daily 11/7/24   Paula Quezada, APRN - CNP   venlafaxine (EFFEXOR) 75 MG tablet Take 2 tablets by mouth daily 10/16/24   Sydnie Barajas, DO   benzoyl peroxide 5 % external liquid Apply topically 2 times daily Apply topically 2 times daily. 7/1/24   Nilo Aquino, DNP

## 2024-11-26 ENCOUNTER — TELEPHONE (OUTPATIENT)
Dept: SURGERY | Age: 24
End: 2024-11-26

## 2024-11-26 NOTE — TELEPHONE ENCOUNTER
Writer spoke Donald GRIFFIN and was advised that patient wants to cancel her procedure due to changing insurance. Writer called patient to reschedule and left message to return call.    Please cancel colonoscopy

## 2024-12-27 RX ORDER — VENLAFAXINE 75 MG/1
150 TABLET ORAL DAILY
Qty: 60 TABLET | Refills: 2 | Status: SHIPPED | OUTPATIENT
Start: 2024-12-27

## 2024-12-27 NOTE — TELEPHONE ENCOUNTER
Last visit:  9/30/2024  Next Visit Date:  No future appointments.      Medication List:  Prior to Admission medications    Medication Sig Start Date End Date Taking? Authorizing Provider   ondansetron (ZOFRAN-ODT) 4 MG disintegrating tablet Take 1 tablet by mouth every 8 hours as needed for Nausea or Vomiting 11/25/24   Alejandro Nieto DO   venlafaxine (EFFEXOR) 75 MG tablet Take 2 tablets by mouth daily 11/25/24   Alejandro Nieto DO   ondansetron (ZOFRAN-ODT) 4 MG disintegrating tablet Take 1 tablet by mouth every 8 hours as needed for Nausea or Vomiting 11/25/24   Alejandro Nieto DO   norgestrel-ethinyl estradiol (ELINEST) 0.3-30 MG-MCG per tablet Take 1 tablet by mouth daily 11/7/24   Paula Quezada, APRN - CNP   benzoyl peroxide 5 % external liquid Apply topically 2 times daily Apply topically 2 times daily. 7/1/24   Nilo Aquino, DNP

## 2025-01-20 ENCOUNTER — TELEPHONE (OUTPATIENT)
Dept: FAMILY MEDICINE CLINIC | Age: 25
End: 2025-01-20

## 2025-01-20 RX ORDER — PERMETHRIN 50 MG/G
CREAM TOPICAL
Qty: 30 G | Refills: 0 | Status: SHIPPED | OUTPATIENT
Start: 2025-01-20

## 2025-01-24 RX ORDER — VENLAFAXINE 75 MG/1
150 TABLET ORAL DAILY
Qty: 60 TABLET | Refills: 2 | Status: SHIPPED | OUTPATIENT
Start: 2025-01-24

## 2025-01-24 NOTE — TELEPHONE ENCOUNTER
Last visit:  9/30/2024  Next Visit Date:  No future appointments.      Medication List:  Prior to Admission medications    Medication Sig Start Date End Date Taking? Authorizing Provider   permethrin (ELIMITE) 5 % cream Apply to all areas of the body from the neck to soles of feet; leave on for 8 to 14 hours before removing by washing (shower or bath). 1/20/25   Nilo Aquino DNP   venlafaxine (EFFEXOR) 75 MG tablet Take 2 tablets by mouth daily 12/27/24   Sydnie Barajas, DO   ondansetron (ZOFRAN-ODT) 4 MG disintegrating tablet Take 1 tablet by mouth every 8 hours as needed for Nausea or Vomiting 11/25/24   Alejandro Nieto,    ondansetron (ZOFRAN-ODT) 4 MG disintegrating tablet Take 1 tablet by mouth every 8 hours as needed for Nausea or Vomiting 11/25/24   Alejandro Nieto,    norgestrel-ethinyl estradiol (ELINEST) 0.3-30 MG-MCG per tablet Take 1 tablet by mouth daily 11/7/24   Paula Quezada, APRN - CNP   benzoyl peroxide 5 % external liquid Apply topically 2 times daily Apply topically 2 times daily. 7/1/24   Nilo Aquino DNP

## 2025-04-14 RX ORDER — NORGESTREL AND ETHINYL ESTRADIOL 0.3-0.03MG
1 KIT ORAL DAILY
Qty: 84 TABLET | Refills: 2 | Status: SHIPPED | OUTPATIENT
Start: 2025-04-14

## 2025-04-14 RX ORDER — VENLAFAXINE 75 MG/1
150 TABLET ORAL DAILY
Qty: 60 TABLET | Refills: 2 | Status: SHIPPED | OUTPATIENT
Start: 2025-04-14

## 2025-04-14 NOTE — TELEPHONE ENCOUNTER
Last visit:  9/30/2024  Next Visit Date:  No future appointments.      Medication List:  Prior to Admission medications    Medication Sig Start Date End Date Taking? Authorizing Provider   venlafaxine (EFFEXOR) 75 MG tablet Take 2 tablets by mouth daily 1/24/25   Nilo Aquino DNP   permethrin (ELIMITE) 5 % cream Apply to all areas of the body from the neck to soles of feet; leave on for 8 to 14 hours before removing by washing (shower or bath). 1/20/25   Nilo Aquino DNP   ondansetron (ZOFRAN-ODT) 4 MG disintegrating tablet Take 1 tablet by mouth every 8 hours as needed for Nausea or Vomiting 11/25/24   Alejandro Nieto DO   ondansetron (ZOFRAN-ODT) 4 MG disintegrating tablet Take 1 tablet by mouth every 8 hours as needed for Nausea or Vomiting 11/25/24   Alejandro Nieto DO   norgestrel-ethinyl estradiol (ELINEST) 0.3-30 MG-MCG per tablet Take 1 tablet by mouth daily 11/7/24   Paula Quezada, APRN - CNP   benzoyl peroxide 5 % external liquid Apply topically 2 times daily Apply topically 2 times daily. 7/1/24   Nilo Aquino DNP

## 2025-04-17 ENCOUNTER — OFFICE VISIT (OUTPATIENT)
Dept: FAMILY MEDICINE CLINIC | Age: 25
End: 2025-04-17
Payer: COMMERCIAL

## 2025-04-17 VITALS
TEMPERATURE: 98 F | HEIGHT: 62 IN | OXYGEN SATURATION: 98 % | HEART RATE: 96 BPM | DIASTOLIC BLOOD PRESSURE: 60 MMHG | BODY MASS INDEX: 26.5 KG/M2 | SYSTOLIC BLOOD PRESSURE: 106 MMHG | WEIGHT: 144 LBS

## 2025-04-17 DIAGNOSIS — J02.0 ACUTE STREPTOCOCCAL PHARYNGITIS: Primary | ICD-10-CM

## 2025-04-17 LAB — S PYO AG THROAT QL: POSITIVE

## 2025-04-17 PROCEDURE — G8427 DOCREV CUR MEDS BY ELIG CLIN: HCPCS | Performed by: NURSE PRACTITIONER

## 2025-04-17 PROCEDURE — 1036F TOBACCO NON-USER: CPT | Performed by: NURSE PRACTITIONER

## 2025-04-17 PROCEDURE — 99213 OFFICE O/P EST LOW 20 MIN: CPT | Performed by: NURSE PRACTITIONER

## 2025-04-17 PROCEDURE — G8419 CALC BMI OUT NRM PARAM NOF/U: HCPCS | Performed by: NURSE PRACTITIONER

## 2025-04-17 PROCEDURE — 87880 STREP A ASSAY W/OPTIC: CPT | Performed by: NURSE PRACTITIONER

## 2025-04-17 RX ORDER — CEPHALEXIN 500 MG/1
500 CAPSULE ORAL 2 TIMES DAILY
Qty: 20 CAPSULE | Refills: 0 | Status: SHIPPED | OUTPATIENT
Start: 2025-04-17 | End: 2025-04-27

## 2025-04-17 SDOH — ECONOMIC STABILITY: FOOD INSECURITY: WITHIN THE PAST 12 MONTHS, THE FOOD YOU BOUGHT JUST DIDN'T LAST AND YOU DIDN'T HAVE MONEY TO GET MORE.: NEVER TRUE

## 2025-04-17 SDOH — ECONOMIC STABILITY: FOOD INSECURITY: WITHIN THE PAST 12 MONTHS, YOU WORRIED THAT YOUR FOOD WOULD RUN OUT BEFORE YOU GOT MONEY TO BUY MORE.: NEVER TRUE

## 2025-04-17 ASSESSMENT — PATIENT HEALTH QUESTIONNAIRE - PHQ9
4. FEELING TIRED OR HAVING LITTLE ENERGY: NOT AT ALL
3. TROUBLE FALLING OR STAYING ASLEEP: NOT AT ALL
9. THOUGHTS THAT YOU WOULD BE BETTER OFF DEAD, OR OF HURTING YOURSELF: NOT AT ALL
7. TROUBLE CONCENTRATING ON THINGS, SUCH AS READING THE NEWSPAPER OR WATCHING TELEVISION: NOT AT ALL
2. FEELING DOWN, DEPRESSED OR HOPELESS: NOT AT ALL
8. MOVING OR SPEAKING SO SLOWLY THAT OTHER PEOPLE COULD HAVE NOTICED. OR THE OPPOSITE, BEING SO FIGETY OR RESTLESS THAT YOU HAVE BEEN MOVING AROUND A LOT MORE THAN USUAL: NOT AT ALL
10. IF YOU CHECKED OFF ANY PROBLEMS, HOW DIFFICULT HAVE THESE PROBLEMS MADE IT FOR YOU TO DO YOUR WORK, TAKE CARE OF THINGS AT HOME, OR GET ALONG WITH OTHER PEOPLE: NOT DIFFICULT AT ALL
1. LITTLE INTEREST OR PLEASURE IN DOING THINGS: NOT AT ALL
SUM OF ALL RESPONSES TO PHQ QUESTIONS 1-9: 0
SUM OF ALL RESPONSES TO PHQ QUESTIONS 1-9: 0
5. POOR APPETITE OR OVEREATING: NOT AT ALL
6. FEELING BAD ABOUT YOURSELF - OR THAT YOU ARE A FAILURE OR HAVE LET YOURSELF OR YOUR FAMILY DOWN: NOT AT ALL
SUM OF ALL RESPONSES TO PHQ QUESTIONS 1-9: 0
SUM OF ALL RESPONSES TO PHQ QUESTIONS 1-9: 0

## 2025-04-17 ASSESSMENT — ENCOUNTER SYMPTOMS
BLOOD IN STOOL: 0
RHINORRHEA: 0
SORE THROAT: 1
CONSTIPATION: 0
ABDOMINAL PAIN: 0
VOMITING: 0
BACK PAIN: 0
NAUSEA: 0
SHORTNESS OF BREATH: 0
COUGH: 1
DIARRHEA: 0

## 2025-04-17 NOTE — PROGRESS NOTES
HPI Notes    Name: Kathy Osorio  : 2000         Chief Complaint:     Chief Complaint   Patient presents with    Pharyngitis     Sore throat for x 3 days, trouble swallowing, no fever, has productive cough        History of Present Illness:        Pharyngitis  This is a new problem. The current episode started in the past 7 days. The problem occurs constantly. The problem has been gradually worsening. Associated symptoms include congestion, coughing and a sore throat. Pertinent negatives include no abdominal pain, chest pain, chills, fever, headaches, nausea, neck pain, rash, vomiting or weakness. Nothing aggravates the symptoms. She has tried NSAIDs for the symptoms. The treatment provided mild relief.       Past Medical History:     No past medical history on file.   Reviewed all health maintenance requirements and ordered appropriate tests  Health Maintenance Due   Topic Date Due    Varicella vaccine (1 of 2 - 13+ 2-dose series) Never done    HPV vaccine (1 - 3-dose series) Never done    HIV screen  Never done    Hepatitis C screen  Never done    DTaP/Tdap/Td vaccine (7 - Td or Tdap) 10/22/2023    COVID-19 Vaccine ( season) Never done       Past Surgical History:     Past Surgical History:   Procedure Laterality Date    HERNIA REPAIR      MYRINGOTOMY AND TYMPANOSTOMY TUBE PLACEMENT          Medications:       Prior to Admission medications    Medication Sig Start Date End Date Taking? Authorizing Provider   cephALEXin (KEFLEX) 500 MG capsule Take 1 capsule by mouth 2 times daily for 10 days 25 Yes Nilo Aquino DNP   norgestrel-ethinyl estradiol (ELINEST) 0.3-30 MG-MCG per tablet Take 1 tablet by mouth daily 25   Paula Quezada APRN - CNP   venlafaxine (EFFEXOR) 75 MG tablet Take 2 tablets by mouth daily 25   Nilo Aquino DNP   permethrin (ELIMITE) 5 % cream Apply to all areas of the body from the neck to soles of feet; leave on for 8 to 14 hours before

## 2025-05-06 RX ORDER — ONDANSETRON 4 MG/1
4 TABLET, ORALLY DISINTEGRATING ORAL EVERY 8 HOURS PRN
Qty: 90 TABLET | Refills: 1 | Status: SHIPPED | OUTPATIENT
Start: 2025-05-06

## 2025-06-02 ENCOUNTER — HOSPITAL ENCOUNTER (OUTPATIENT)
Age: 25
Setting detail: SPECIMEN
Discharge: HOME | End: 2025-06-02
Payer: COMMERCIAL

## 2025-06-02 ENCOUNTER — OFFICE VISIT (OUTPATIENT)
Dept: FAMILY MEDICINE CLINIC | Age: 25
End: 2025-06-02
Payer: COMMERCIAL

## 2025-06-02 VITALS
DIASTOLIC BLOOD PRESSURE: 62 MMHG | WEIGHT: 141.5 LBS | OXYGEN SATURATION: 98 % | SYSTOLIC BLOOD PRESSURE: 116 MMHG | HEIGHT: 62 IN | HEART RATE: 86 BPM | BODY MASS INDEX: 26.04 KG/M2

## 2025-06-02 DIAGNOSIS — J02.0 ACUTE STREPTOCOCCAL PHARYNGITIS: ICD-10-CM

## 2025-06-02 DIAGNOSIS — J02.0 ACUTE STREPTOCOCCAL PHARYNGITIS: Primary | ICD-10-CM

## 2025-06-02 LAB — S PYO AG THROAT QL: POSITIVE

## 2025-06-02 PROCEDURE — 87880 STREP A ASSAY W/OPTIC: CPT | Performed by: NURSE PRACTITIONER

## 2025-06-02 PROCEDURE — 87081 CULTURE SCREEN ONLY: CPT

## 2025-06-02 PROCEDURE — 87077 CULTURE AEROBIC IDENTIFY: CPT

## 2025-06-02 PROCEDURE — 1036F TOBACCO NON-USER: CPT | Performed by: NURSE PRACTITIONER

## 2025-06-02 PROCEDURE — G8419 CALC BMI OUT NRM PARAM NOF/U: HCPCS | Performed by: NURSE PRACTITIONER

## 2025-06-02 PROCEDURE — G8427 DOCREV CUR MEDS BY ELIG CLIN: HCPCS | Performed by: NURSE PRACTITIONER

## 2025-06-02 PROCEDURE — 99213 OFFICE O/P EST LOW 20 MIN: CPT | Performed by: NURSE PRACTITIONER

## 2025-06-02 RX ORDER — CEFDINIR 300 MG/1
300 CAPSULE ORAL 2 TIMES DAILY
Qty: 20 CAPSULE | Refills: 0 | Status: SHIPPED | OUTPATIENT
Start: 2025-06-02 | End: 2025-06-12

## 2025-06-02 RX ORDER — CEPHALEXIN 500 MG/1
500 CAPSULE ORAL 2 TIMES DAILY
COMMUNITY
Start: 2025-05-25

## 2025-06-02 ASSESSMENT — ENCOUNTER SYMPTOMS
VOMITING: 0
NAUSEA: 0
COUGH: 0
SORE THROAT: 1
SHORTNESS OF BREATH: 0
DIARRHEA: 0

## 2025-06-02 NOTE — PATIENT INSTRUCTIONS
SURVEY:    You may be receiving a survey from [facility] regarding your visit today.    You may get this in the mail, through your MyChart or in your email.     Please complete the survey to enable us to provide the highest quality of care to you and your family.      Thank you.    Clinical Care Team:         ALEXIS Proctor-REBECA Wild                         Triage:         REBECA Dumont             Clerical Team:        Svetlana Oden       [place] [phone]           Billing questions: [phone]           Medical Records Request: [phone]

## 2025-06-02 NOTE — PROGRESS NOTES
HPI Notes    Name: Kathy Osorio  : 2000         Chief Complaint:     Chief Complaint   Patient presents with    Sore Throat      States that she has been positive for strep about a month ago and has noticed that the swelling in her tonsils and throat have not gone away. Notes that she has finished all antibiotics and is still in pain.        History of Present Illness:        HPI  Pt is a 26 yo female who presents for sore throat, enlarged tonsils. 2 weeks ago had similar symptoms and did a telehealth visit, was prescribed Keflex. Last dose of Keflex 25.   Past Medical History:     No past medical history on file.   Reviewed all health maintenance requirements and ordered appropriate tests  Health Maintenance Due   Topic Date Due    Varicella vaccine (1 of 2 - 13+ 2-dose series) Never done    HPV vaccine (1 - 3-dose series) Never done    HIV screen  Never done    Hepatitis C screen  Never done    DTaP/Tdap/Td vaccine (7 - Td or Tdap) 10/22/2023    COVID-19 Vaccine (2024- season) Never done       Past Surgical History:     Past Surgical History:   Procedure Laterality Date    HERNIA REPAIR      MYRINGOTOMY AND TYMPANOSTOMY TUBE PLACEMENT          Medications:       Prior to Admission medications    Medication Sig Start Date End Date Taking? Authorizing Provider   cefdinir (OMNICEF) 300 MG capsule Take 1 capsule by mouth 2 times daily for 10 days 25 Yes Nilo Aquino DNP   norgestrel-ethinyl estradiol (ELINEST) 0.3-30 MG-MCG per tablet Take 1 tablet by mouth daily 25  Yes Paula Quezada, APRN - CNP   venlafaxine (EFFEXOR) 75 MG tablet Take 2 tablets by mouth daily 25  Yes Nilo Aquino DNP   permethrin (ELIMITE) 5 % cream Apply to all areas of the body from the neck to soles of feet; leave on for 8 to 14 hours before removing by washing (shower or bath). 25  Yes Nilo Aquino DNP   ondansetron (ZOFRAN-ODT) 4 MG disintegrating tablet Take 1 tablet by

## 2025-06-03 LAB
MICROORGANISM SPEC CULT: ABNORMAL
SERVICE CMNT-IMP: ABNORMAL
SPECIMEN DESCRIPTION: ABNORMAL

## 2025-06-10 RX ORDER — VENLAFAXINE 75 MG/1
150 TABLET ORAL DAILY
Qty: 60 TABLET | Refills: 2 | Status: SHIPPED | OUTPATIENT
Start: 2025-06-10

## 2025-06-10 NOTE — TELEPHONE ENCOUNTER
Last OV: 6/2/2025  strep 12/21/23 [facility]   Last RX:    Next scheduled apt: Visit date not found        Surescript requesting a refill   Medication pending for approval

## 2025-06-16 RX ORDER — ONDANSETRON 4 MG/1
4 TABLET, ORALLY DISINTEGRATING ORAL EVERY 8 HOURS PRN
Qty: 90 TABLET | Refills: 1 | Status: SHIPPED | OUTPATIENT
Start: 2025-06-16

## 2025-07-30 NOTE — PATIENT INSTRUCTIONS
SURVEY:    You may be receiving a survey from [employer] regarding your visit today. Please complete the survey to enable us to provide the highest quality of care to you and your family. If you cannot score us a very good on any question, please call the office to discuss how we could of made your experience a very good one. Thank you for letting us take care of you today. We hope all your questions were addressed. If a question was overlooked or something else comes to mind after you return home, please contact a member of your Care Team listed below.     Thank you,  Vero Quintana MA      Your Care Team at [address]  Provider- JOHN Nieto  Provider- ALEXIS Mir-TRINITY  [address], [address] [facility]  Reception- Sim Zhong, [address] [facility]      Walk-in contact numbers:       Phone: [phone]                 Fax: [phone]    Payam Rendon Hours:  Mon-Thurs: 9:00 am - 5:30 pm     Friday: 8:00 am - 12:00 pm           Sat-Sun: CLOSED IV discontinued, cath removed intact

## 2025-08-11 RX ORDER — VENLAFAXINE 75 MG/1
150 TABLET ORAL DAILY
Qty: 60 TABLET | Refills: 0 | Status: SHIPPED | OUTPATIENT
Start: 2025-08-11

## 2025-09-04 RX ORDER — ONDANSETRON 4 MG/1
4 TABLET, ORALLY DISINTEGRATING ORAL EVERY 8 HOURS PRN
Qty: 90 TABLET | Refills: 1 | Status: SHIPPED | OUTPATIENT
Start: 2025-09-04